# Patient Record
Sex: FEMALE | Race: WHITE | Employment: FULL TIME | URBAN - METROPOLITAN AREA
[De-identification: names, ages, dates, MRNs, and addresses within clinical notes are randomized per-mention and may not be internally consistent; named-entity substitution may affect disease eponyms.]

---

## 2017-01-25 ENCOUNTER — OFFICE VISIT (OUTPATIENT)
Dept: FAMILY MEDICINE CLINIC | Age: 52
End: 2017-01-25

## 2017-01-25 VITALS
BODY MASS INDEX: 25.9 KG/M2 | HEIGHT: 67 IN | WEIGHT: 165 LBS | RESPIRATION RATE: 17 BRPM | DIASTOLIC BLOOD PRESSURE: 62 MMHG | OXYGEN SATURATION: 97 % | SYSTOLIC BLOOD PRESSURE: 96 MMHG | HEART RATE: 64 BPM

## 2017-01-25 DIAGNOSIS — J30.1 ALLERGIC RHINITIS DUE TO POLLEN, UNSPECIFIED RHINITIS SEASONALITY: ICD-10-CM

## 2017-01-25 DIAGNOSIS — N63.10 MASS OF RIGHT BREAST: Primary | ICD-10-CM

## 2017-01-25 DIAGNOSIS — F41.9 ANXIETY: ICD-10-CM

## 2017-01-25 PROCEDURE — 99214 OFFICE O/P EST MOD 30 MIN: CPT | Performed by: NURSE PRACTITIONER

## 2017-01-25 RX ORDER — LORATADINE 10 MG/1
10 CAPSULE, LIQUID FILLED ORAL DAILY
COMMUNITY

## 2017-01-25 RX ORDER — ALPRAZOLAM 0.5 MG/1
0.5 TABLET ORAL 2 TIMES DAILY PRN
Qty: 60 TABLET | Refills: 2 | Status: SHIPPED | OUTPATIENT
Start: 2017-01-25 | End: 2017-04-26 | Stop reason: SDUPTHER

## 2017-01-25 ASSESSMENT — ENCOUNTER SYMPTOMS
SHORTNESS OF BREATH: 0
BACK PAIN: 0
ABDOMINAL DISTENTION: 0
NAUSEA: 0
VOMITING: 0

## 2017-01-27 ENCOUNTER — TELEPHONE (OUTPATIENT)
Dept: FAMILY MEDICINE CLINIC | Age: 52
End: 2017-01-27

## 2017-01-27 ENCOUNTER — HOSPITAL ENCOUNTER (OUTPATIENT)
Dept: MRI IMAGING | Age: 52
Discharge: HOME | End: 2017-01-27
Attending: NURSE PRACTITIONER | Admitting: NURSE PRACTITIONER

## 2017-01-27 ENCOUNTER — HOSPITAL ENCOUNTER (OUTPATIENT)
Dept: ULTRASOUND IMAGING | Age: 52
Discharge: HOME | End: 2017-01-27
Attending: NURSE PRACTITIONER | Admitting: NURSE PRACTITIONER

## 2017-01-27 DIAGNOSIS — R92.8 ABNORMAL MAMMOGRAM OF RIGHT BREAST: Primary | ICD-10-CM

## 2017-01-27 DIAGNOSIS — N63.10 MASS OF RIGHT BREAST: ICD-10-CM

## 2017-01-27 DIAGNOSIS — R92.8 ABNORMAL MAMMOGRAM OF RIGHT BREAST: ICD-10-CM

## 2017-01-29 DIAGNOSIS — N63.10 BREAST MASS, RIGHT: Primary | ICD-10-CM

## 2017-02-06 PROBLEM — N60.09 BREAST CYST: Status: ACTIVE | Noted: 2017-02-06

## 2017-03-06 PROBLEM — Z80.3 FAMILY HISTORY OF BREAST CANCER: Status: ACTIVE | Noted: 2017-03-06

## 2017-03-06 PROBLEM — N60.19 FIBROCYSTIC DISEASE OF BREAST: Status: ACTIVE | Noted: 2017-03-06

## 2017-04-26 ENCOUNTER — OFFICE VISIT (OUTPATIENT)
Dept: FAMILY MEDICINE CLINIC | Age: 52
End: 2017-04-26

## 2017-04-26 ENCOUNTER — TELEPHONE (OUTPATIENT)
Dept: FAMILY MEDICINE CLINIC | Age: 52
End: 2017-04-26

## 2017-04-26 VITALS
RESPIRATION RATE: 17 BRPM | OXYGEN SATURATION: 99 % | BODY MASS INDEX: 25.62 KG/M2 | HEART RATE: 64 BPM | HEIGHT: 67 IN | DIASTOLIC BLOOD PRESSURE: 62 MMHG | WEIGHT: 163.2 LBS | SYSTOLIC BLOOD PRESSURE: 100 MMHG

## 2017-04-26 DIAGNOSIS — L98.9 SKIN LESION: ICD-10-CM

## 2017-04-26 DIAGNOSIS — F41.9 ANXIETY: Primary | ICD-10-CM

## 2017-04-26 DIAGNOSIS — E03.9 HYPOTHYROIDISM, UNSPECIFIED TYPE: ICD-10-CM

## 2017-04-26 PROCEDURE — 99214 OFFICE O/P EST MOD 30 MIN: CPT | Performed by: NURSE PRACTITIONER

## 2017-04-26 RX ORDER — LEVOTHYROXINE SODIUM 0.05 MG/1
50 TABLET ORAL DAILY
Qty: 90 TABLET | Refills: 1 | Status: SHIPPED | OUTPATIENT
Start: 2017-04-26 | End: 2017-10-25 | Stop reason: SDUPTHER

## 2017-04-26 RX ORDER — ALPRAZOLAM 0.5 MG/1
0.5 TABLET ORAL 2 TIMES DAILY PRN
Qty: 180 TABLET | Refills: 1 | Status: SHIPPED | OUTPATIENT
Start: 2017-04-26 | End: 2017-10-25 | Stop reason: SDUPTHER

## 2017-04-26 ASSESSMENT — ENCOUNTER SYMPTOMS
VOMITING: 0
BACK PAIN: 0
ABDOMINAL DISTENTION: 0
SHORTNESS OF BREATH: 0
NAUSEA: 0

## 2017-04-26 ASSESSMENT — PATIENT HEALTH QUESTIONNAIRE - PHQ9
SUM OF ALL RESPONSES TO PHQ QUESTIONS 1-9: 0
SUM OF ALL RESPONSES TO PHQ9 QUESTIONS 1 & 2: 0
1. LITTLE INTEREST OR PLEASURE IN DOING THINGS: 0
2. FEELING DOWN, DEPRESSED OR HOPELESS: 0

## 2017-04-27 DIAGNOSIS — Z12.11 SCREENING FOR COLON CANCER: ICD-10-CM

## 2017-04-27 LAB
CONTROL: NORMAL
HEMOCCULT STL QL: NORMAL

## 2017-10-17 RX ORDER — DROSPIRENONE AND ETHINYL ESTRADIOL TABLETS 0.02-3(28)
KIT ORAL
Qty: 84 TABLET | Refills: 3 | Status: SHIPPED | OUTPATIENT
Start: 2017-10-17 | End: 2020-02-11

## 2017-10-25 ENCOUNTER — OFFICE VISIT (OUTPATIENT)
Dept: FAMILY MEDICINE CLINIC | Age: 52
End: 2017-10-25

## 2017-10-25 VITALS
WEIGHT: 173.2 LBS | SYSTOLIC BLOOD PRESSURE: 112 MMHG | BODY MASS INDEX: 27.18 KG/M2 | RESPIRATION RATE: 17 BRPM | DIASTOLIC BLOOD PRESSURE: 68 MMHG | OXYGEN SATURATION: 98 % | HEART RATE: 66 BPM | HEIGHT: 67 IN

## 2017-10-25 DIAGNOSIS — Z11.59 NEED FOR HEPATITIS C SCREENING TEST: ICD-10-CM

## 2017-10-25 DIAGNOSIS — R53.83 LETHARGY: Primary | ICD-10-CM

## 2017-10-25 DIAGNOSIS — Z13.0 SCREENING FOR DEFICIENCY ANEMIA: ICD-10-CM

## 2017-10-25 DIAGNOSIS — Z11.4 SCREENING FOR HIV WITHOUT PRESENCE OF RISK FACTORS: ICD-10-CM

## 2017-10-25 DIAGNOSIS — F41.9 ANXIETY: ICD-10-CM

## 2017-10-25 DIAGNOSIS — Z13.220 SCREENING FOR LIPID DISORDERS: ICD-10-CM

## 2017-10-25 DIAGNOSIS — E03.9 HYPOTHYROIDISM, UNSPECIFIED TYPE: ICD-10-CM

## 2017-10-25 DIAGNOSIS — Z13.1 SCREENING FOR DIABETES MELLITUS: ICD-10-CM

## 2017-10-25 LAB
A/G RATIO: 1.5 (ref 1.1–2.2)
ALBUMIN SERPL-MCNC: 3.8 G/DL (ref 3.4–5)
ALP BLD-CCNC: 61 U/L (ref 40–129)
ALT SERPL-CCNC: 12 U/L (ref 10–40)
ANION GAP SERPL CALCULATED.3IONS-SCNC: 12 MMOL/L (ref 3–16)
AST SERPL-CCNC: 15 U/L (ref 15–37)
BASOPHILS ABSOLUTE: 0.1 K/UL (ref 0–0.2)
BASOPHILS RELATIVE PERCENT: 1.4 %
BILIRUB SERPL-MCNC: 0.4 MG/DL (ref 0–1)
BUN BLDV-MCNC: 17 MG/DL (ref 7–20)
CALCIUM SERPL-MCNC: 8.6 MG/DL (ref 8.3–10.6)
CHLORIDE BLD-SCNC: 105 MMOL/L (ref 99–110)
CHOLESTEROL, TOTAL: 150 MG/DL (ref 0–199)
CO2: 25 MMOL/L (ref 21–32)
CREAT SERPL-MCNC: 0.9 MG/DL (ref 0.6–1.1)
EOSINOPHILS ABSOLUTE: 0.3 K/UL (ref 0–0.6)
EOSINOPHILS RELATIVE PERCENT: 2.9 %
GFR AFRICAN AMERICAN: >60
GFR NON-AFRICAN AMERICAN: >60
GLOBULIN: 2.6 G/DL
GLUCOSE BLD-MCNC: 80 MG/DL (ref 70–99)
HCT VFR BLD CALC: 39.5 % (ref 36–48)
HDLC SERPL-MCNC: 46 MG/DL (ref 40–60)
HEMOGLOBIN: 12.9 G/DL (ref 12–16)
HEPATITIS C ANTIBODY INTERPRETATION: NORMAL
LDL CHOLESTEROL CALCULATED: ABNORMAL MG/DL
LDL CHOLESTEROL DIRECT: 65 MG/DL
LYMPHOCYTES ABSOLUTE: 2 K/UL (ref 1–5.1)
LYMPHOCYTES RELATIVE PERCENT: 20.6 %
MCH RBC QN AUTO: 30.7 PG (ref 26–34)
MCHC RBC AUTO-ENTMCNC: 32.7 G/DL (ref 31–36)
MCV RBC AUTO: 93.9 FL (ref 80–100)
MONOCYTES ABSOLUTE: 0.6 K/UL (ref 0–1.3)
MONOCYTES RELATIVE PERCENT: 6.2 %
NEUTROPHILS ABSOLUTE: 6.8 K/UL (ref 1.7–7.7)
NEUTROPHILS RELATIVE PERCENT: 68.9 %
PDW BLD-RTO: 13.1 % (ref 12.4–15.4)
PLATELET # BLD: 337 K/UL (ref 135–450)
PMV BLD AUTO: 8 FL (ref 5–10.5)
POTASSIUM SERPL-SCNC: 4.6 MMOL/L (ref 3.5–5.1)
RBC # BLD: 4.2 M/UL (ref 4–5.2)
SODIUM BLD-SCNC: 142 MMOL/L (ref 136–145)
T4 FREE: 1.1 NG/DL (ref 0.9–1.8)
TOTAL PROTEIN: 6.4 G/DL (ref 6.4–8.2)
TRIGL SERPL-MCNC: 322 MG/DL (ref 0–150)
TSH REFLEX: 4.38 UIU/ML (ref 0.27–4.2)
VITAMIN D 25-HYDROXY: 39.9 NG/ML
VLDLC SERPL CALC-MCNC: ABNORMAL MG/DL
WBC # BLD: 9.9 K/UL (ref 4–11)

## 2017-10-25 PROCEDURE — 36415 COLL VENOUS BLD VENIPUNCTURE: CPT | Performed by: NURSE PRACTITIONER

## 2017-10-25 PROCEDURE — 99214 OFFICE O/P EST MOD 30 MIN: CPT | Performed by: NURSE PRACTITIONER

## 2017-10-25 RX ORDER — DIFLORASONE DIACETATE 0.5 MG/G
CREAM TOPICAL
Qty: 30 G | Refills: 1 | Status: SHIPPED | OUTPATIENT
Start: 2017-10-25 | End: 2018-10-24 | Stop reason: SDUPTHER

## 2017-10-25 RX ORDER — LEVOTHYROXINE SODIUM 0.05 MG/1
50 TABLET ORAL DAILY
Qty: 90 TABLET | Refills: 1 | Status: SHIPPED | OUTPATIENT
Start: 2017-10-25 | End: 2018-04-25 | Stop reason: SDUPTHER

## 2017-10-25 RX ORDER — ALPRAZOLAM 0.5 MG/1
0.5 TABLET ORAL 2 TIMES DAILY PRN
Qty: 180 TABLET | Refills: 1 | Status: SHIPPED | OUTPATIENT
Start: 2017-10-25 | End: 2018-04-25 | Stop reason: SDUPTHER

## 2017-10-25 ASSESSMENT — ENCOUNTER SYMPTOMS
SHORTNESS OF BREATH: 0
VOMITING: 0
NAUSEA: 0
ABDOMINAL DISTENTION: 0

## 2017-10-26 LAB — HIV-1 AND HIV-2 ANTIBODIES: NORMAL

## 2018-03-07 ENCOUNTER — OFFICE VISIT (OUTPATIENT)
Dept: FAMILY MEDICINE CLINIC | Age: 53
End: 2018-03-07

## 2018-03-07 VITALS
SYSTOLIC BLOOD PRESSURE: 110 MMHG | BODY MASS INDEX: 26.65 KG/M2 | HEART RATE: 62 BPM | WEIGHT: 169.8 LBS | DIASTOLIC BLOOD PRESSURE: 68 MMHG | OXYGEN SATURATION: 99 % | TEMPERATURE: 97.7 F | HEIGHT: 67 IN

## 2018-03-07 DIAGNOSIS — B96.89 ACUTE BACTERIAL SINUSITIS: Primary | ICD-10-CM

## 2018-03-07 DIAGNOSIS — J01.90 ACUTE BACTERIAL SINUSITIS: Primary | ICD-10-CM

## 2018-03-07 PROCEDURE — 99213 OFFICE O/P EST LOW 20 MIN: CPT | Performed by: NURSE PRACTITIONER

## 2018-03-07 RX ORDER — CIPROFLOXACIN 500 MG/1
500 TABLET, FILM COATED ORAL 2 TIMES DAILY
Qty: 20 TABLET | Refills: 0 | Status: SHIPPED | OUTPATIENT
Start: 2018-03-07 | End: 2018-03-17

## 2018-03-07 RX ORDER — METHYLPREDNISOLONE 4 MG/1
TABLET ORAL
Qty: 1 KIT | Refills: 0 | Status: SHIPPED | OUTPATIENT
Start: 2018-03-07 | End: 2019-04-18

## 2018-03-07 RX ORDER — PROMETHAZINE HYDROCHLORIDE AND CODEINE PHOSPHATE 6.25; 1 MG/5ML; MG/5ML
5 SYRUP ORAL NIGHTLY PRN
Qty: 120 ML | Refills: 0 | Status: SHIPPED | OUTPATIENT
Start: 2018-03-07 | End: 2018-04-06

## 2018-03-07 RX ORDER — BENZONATATE 200 MG/1
200 CAPSULE ORAL 3 TIMES DAILY PRN
Qty: 30 CAPSULE | Refills: 0 | Status: SHIPPED | OUTPATIENT
Start: 2018-03-07 | End: 2018-03-17

## 2018-03-07 ASSESSMENT — PATIENT HEALTH QUESTIONNAIRE - PHQ9
2. FEELING DOWN, DEPRESSED OR HOPELESS: 0
SUM OF ALL RESPONSES TO PHQ9 QUESTIONS 1 & 2: 0
SUM OF ALL RESPONSES TO PHQ QUESTIONS 1-9: 0
1. LITTLE INTEREST OR PLEASURE IN DOING THINGS: 0

## 2018-03-07 ASSESSMENT — ENCOUNTER SYMPTOMS
NAUSEA: 0
SINUS PRESSURE: 1
VOMITING: 0
COUGH: 1
VOICE CHANGE: 1
SINUS PAIN: 1
SHORTNESS OF BREATH: 0

## 2018-03-08 NOTE — PROGRESS NOTES
112/68   06/12/17 110/72     Wt Readings from Last 3 Encounters:   03/07/18 169 lb 12.8 oz (77 kg)   10/25/17 173 lb 3.2 oz (78.6 kg)   06/12/17 166 lb 6.4 oz (75.5 kg)     Body mass index is 26.59 kg/m². Physical Exam   Constitutional: She is oriented to person, place, and time. She appears well-developed and well-nourished. HENT:   Head: Normocephalic and atraumatic. Nose: Nose normal.   Mouth/Throat: Oropharynx is clear and moist.   Mild erythema bilateral TM's. Maxillary sinus tenderness to palpation   Eyes: Conjunctivae are normal. Pupils are equal, round, and reactive to light. Neck: Normal range of motion. Neck supple. Cardiovascular: Normal rate, regular rhythm and normal heart sounds. Pulmonary/Chest: Effort normal and breath sounds normal.   Abdominal: Soft. Bowel sounds are normal.   Musculoskeletal: Normal range of motion. Lymphadenopathy:     She has cervical adenopathy. Neurological: She is alert and oriented to person, place, and time. Skin: Skin is warm and dry. Psychiatric: Thought content normal.   Nursing note and vitals reviewed. ASSESSMENT/PLAN:    1. Acute bacterial sinusitis  - ciprofloxacin (CIPRO) 500 MG tablet; Take 1 tablet by mouth 2 times daily for 10 days  Dispense: 20 tablet; Refill: 0  - methylPREDNISolone (MEDROL, CHAITANYA,) 4 MG tablet; Take as directed on the pack  Dispense: 1 kit; Refill: 0  - benzonatate (TESSALON) 200 MG capsule; Take 1 capsule by mouth 3 times daily as needed for Cough  Dispense: 30 capsule; Refill: 0  - promethazine-codeine (PHENERGAN WITH CODEINE) 6.25-10 MG/5ML syrup; Take 5 mLs by mouth nightly as needed for Cough for up to 30 days. Dispense: 120 mL; Refill: 0  Increase oral fluid intake  Rest, activity as tolerated    No orders of the defined types were placed in this encounter.     Current Outpatient Prescriptions   Medication Sig Dispense Refill    ciprofloxacin (CIPRO) 500 MG tablet Take 1 tablet by mouth 2 times daily for 10 days 20

## 2018-04-04 ENCOUNTER — TELEPHONE (OUTPATIENT)
Dept: FAMILY MEDICINE CLINIC | Age: 53
End: 2018-04-04

## 2018-04-04 ENCOUNTER — HOSPITAL ENCOUNTER (OUTPATIENT)
Dept: WOMENS IMAGING | Age: 53
Discharge: HOME | End: 2018-04-04
Attending: OBSTETRICS & GYNECOLOGY | Admitting: OBSTETRICS & GYNECOLOGY

## 2018-04-04 DIAGNOSIS — Z12.39 SCREENING FOR BREAST CANCER: Primary | ICD-10-CM

## 2018-04-04 DIAGNOSIS — Z12.39 SCREENING FOR BREAST CANCER: ICD-10-CM

## 2018-04-05 DIAGNOSIS — R92.8 ABNORMAL MAMMOGRAM: Primary | ICD-10-CM

## 2018-04-10 ENCOUNTER — HOSPITAL ENCOUNTER (OUTPATIENT)
Dept: WOMENS IMAGING | Age: 53
Discharge: HOME | End: 2018-04-10
Attending: NURSE PRACTITIONER | Admitting: NURSE PRACTITIONER

## 2018-04-10 DIAGNOSIS — N63.0 LUMP OR MASS IN BREAST: ICD-10-CM

## 2018-04-10 DIAGNOSIS — R92.8 ABNORMAL MAMMOGRAM: ICD-10-CM

## 2018-04-25 ENCOUNTER — OFFICE VISIT (OUTPATIENT)
Dept: FAMILY MEDICINE CLINIC | Age: 53
End: 2018-04-25

## 2018-04-25 VITALS
HEIGHT: 67 IN | BODY MASS INDEX: 25.99 KG/M2 | OXYGEN SATURATION: 98 % | DIASTOLIC BLOOD PRESSURE: 72 MMHG | RESPIRATION RATE: 16 BRPM | SYSTOLIC BLOOD PRESSURE: 104 MMHG | WEIGHT: 165.6 LBS | HEART RATE: 65 BPM

## 2018-04-25 DIAGNOSIS — F41.9 ANXIETY: ICD-10-CM

## 2018-04-25 DIAGNOSIS — E03.9 HYPOTHYROIDISM, UNSPECIFIED TYPE: ICD-10-CM

## 2018-04-25 DIAGNOSIS — Z13.1 SCREENING FOR DIABETES MELLITUS: ICD-10-CM

## 2018-04-25 DIAGNOSIS — D22.5 ATYPICAL NEVUS OF MULTIPLE SITES OF TRUNK: ICD-10-CM

## 2018-04-25 DIAGNOSIS — Z13.220 SCREENING FOR LIPID DISORDERS: ICD-10-CM

## 2018-04-25 DIAGNOSIS — Z00.00 WELL ADULT EXAM: Primary | ICD-10-CM

## 2018-04-25 DIAGNOSIS — Z12.11 SCREENING FOR COLON CANCER: ICD-10-CM

## 2018-04-25 DIAGNOSIS — Z13.0 SCREENING FOR DEFICIENCY ANEMIA: ICD-10-CM

## 2018-04-25 LAB
A/G RATIO: 2.1 (ref 1.1–2.2)
ALBUMIN SERPL-MCNC: 4.8 G/DL (ref 3.4–5)
ALP BLD-CCNC: 74 U/L (ref 40–129)
ALT SERPL-CCNC: 32 U/L (ref 10–40)
ANION GAP SERPL CALCULATED.3IONS-SCNC: 18 MMOL/L (ref 3–16)
AST SERPL-CCNC: 31 U/L (ref 15–37)
BASOPHILS ABSOLUTE: 0 K/UL (ref 0–0.2)
BASOPHILS RELATIVE PERCENT: 0.6 %
BILIRUB SERPL-MCNC: 0.6 MG/DL (ref 0–1)
BUN BLDV-MCNC: 15 MG/DL (ref 7–20)
CALCIUM SERPL-MCNC: 9.4 MG/DL (ref 8.3–10.6)
CHLORIDE BLD-SCNC: 99 MMOL/L (ref 99–110)
CHOLESTEROL, TOTAL: 190 MG/DL (ref 0–199)
CO2: 23 MMOL/L (ref 21–32)
CREAT SERPL-MCNC: 0.9 MG/DL (ref 0.6–1.1)
EOSINOPHILS ABSOLUTE: 0.2 K/UL (ref 0–0.6)
EOSINOPHILS RELATIVE PERCENT: 2.6 %
GFR AFRICAN AMERICAN: >60
GFR NON-AFRICAN AMERICAN: >60
GLOBULIN: 2.3 G/DL
GLUCOSE BLD-MCNC: 95 MG/DL (ref 70–99)
HCT VFR BLD CALC: 43.3 % (ref 36–48)
HDLC SERPL-MCNC: 56 MG/DL (ref 40–60)
HEMOGLOBIN: 14.4 G/DL (ref 12–16)
LDL CHOLESTEROL CALCULATED: 107 MG/DL
LYMPHOCYTES ABSOLUTE: 2.7 K/UL (ref 1–5.1)
LYMPHOCYTES RELATIVE PERCENT: 44.1 %
MCH RBC QN AUTO: 30.5 PG (ref 26–34)
MCHC RBC AUTO-ENTMCNC: 33.3 G/DL (ref 31–36)
MCV RBC AUTO: 91.7 FL (ref 80–100)
MONOCYTES ABSOLUTE: 0.5 K/UL (ref 0–1.3)
MONOCYTES RELATIVE PERCENT: 8.8 %
NEUTROPHILS ABSOLUTE: 2.7 K/UL (ref 1.7–7.7)
NEUTROPHILS RELATIVE PERCENT: 43.9 %
PDW BLD-RTO: 13.8 % (ref 12.4–15.4)
PLATELET # BLD: 286 K/UL (ref 135–450)
PMV BLD AUTO: 8.6 FL (ref 5–10.5)
POTASSIUM SERPL-SCNC: 4.2 MMOL/L (ref 3.5–5.1)
RBC # BLD: 4.73 M/UL (ref 4–5.2)
SODIUM BLD-SCNC: 140 MMOL/L (ref 136–145)
TOTAL PROTEIN: 7.1 G/DL (ref 6.4–8.2)
TRIGL SERPL-MCNC: 133 MG/DL (ref 0–150)
TSH REFLEX: 2.73 UIU/ML (ref 0.27–4.2)
VLDLC SERPL CALC-MCNC: 27 MG/DL
WBC # BLD: 6.1 K/UL (ref 4–11)

## 2018-04-25 PROCEDURE — 99396 PREV VISIT EST AGE 40-64: CPT | Performed by: NURSE PRACTITIONER

## 2018-04-25 PROCEDURE — 36415 COLL VENOUS BLD VENIPUNCTURE: CPT | Performed by: NURSE PRACTITIONER

## 2018-04-25 RX ORDER — ALPRAZOLAM 0.5 MG/1
0.5 TABLET ORAL 2 TIMES DAILY PRN
Qty: 180 TABLET | Refills: 1 | Status: SHIPPED | OUTPATIENT
Start: 2018-04-25 | End: 2019-04-18

## 2018-04-25 RX ORDER — LEVOTHYROXINE SODIUM 0.05 MG/1
50 TABLET ORAL DAILY
Qty: 90 TABLET | Refills: 1 | Status: SHIPPED | OUTPATIENT
Start: 2018-04-25 | End: 2018-10-24 | Stop reason: SDUPTHER

## 2018-04-25 ASSESSMENT — ENCOUNTER SYMPTOMS
SHORTNESS OF BREATH: 0
ABDOMINAL PAIN: 0
NAUSEA: 0
VOMITING: 0

## 2018-05-04 LAB
CONTROL: NORMAL
HEMOCCULT STL QL: NORMAL

## 2018-05-22 ENCOUNTER — TELEPHONE (OUTPATIENT)
Dept: GASTROENTEROLOGY | Age: 53
End: 2018-05-22

## 2018-06-14 DIAGNOSIS — Z12.11 SCREENING FOR COLON CANCER: ICD-10-CM

## 2018-10-24 ENCOUNTER — OFFICE VISIT (OUTPATIENT)
Dept: FAMILY MEDICINE CLINIC | Age: 53
End: 2018-10-24
Payer: COMMERCIAL

## 2018-10-24 VITALS
BODY MASS INDEX: 25.58 KG/M2 | WEIGHT: 163 LBS | HEART RATE: 59 BPM | RESPIRATION RATE: 17 BRPM | HEIGHT: 67 IN | SYSTOLIC BLOOD PRESSURE: 106 MMHG | OXYGEN SATURATION: 99 % | DIASTOLIC BLOOD PRESSURE: 70 MMHG

## 2018-10-24 DIAGNOSIS — E03.9 HYPOTHYROIDISM, UNSPECIFIED TYPE: ICD-10-CM

## 2018-10-24 DIAGNOSIS — F41.9 ANXIETY: Primary | ICD-10-CM

## 2018-10-24 DIAGNOSIS — Z13.31 POSITIVE DEPRESSION SCREENING: ICD-10-CM

## 2018-10-24 DIAGNOSIS — Z28.20 IMMUNIZATION NOT CARRIED OUT BECAUSE OF PATIENT DECISION: ICD-10-CM

## 2018-10-24 DIAGNOSIS — N64.59 ABNORMAL BREAST EXAM: ICD-10-CM

## 2018-10-24 DIAGNOSIS — L30.9 DERMATITIS: ICD-10-CM

## 2018-10-24 PROCEDURE — G8431 POS CLIN DEPRES SCRN F/U DOC: HCPCS | Performed by: NURSE PRACTITIONER

## 2018-10-24 PROCEDURE — 99214 OFFICE O/P EST MOD 30 MIN: CPT | Performed by: NURSE PRACTITIONER

## 2018-10-24 RX ORDER — LEVOTHYROXINE SODIUM 0.05 MG/1
50 TABLET ORAL DAILY
Qty: 90 TABLET | Refills: 1 | Status: SHIPPED | OUTPATIENT
Start: 2018-10-24 | End: 2019-04-18 | Stop reason: SDUPTHER

## 2018-10-24 RX ORDER — ALPRAZOLAM 0.5 MG/1
0.5 TABLET ORAL NIGHTLY PRN
COMMUNITY
Start: 2018-09-14 | End: 2018-10-24 | Stop reason: DRUGHIGH

## 2018-10-24 RX ORDER — DIFLORASONE DIACETATE 0.5 MG/G
CREAM TOPICAL
Qty: 30 G | Refills: 1 | Status: SHIPPED | OUTPATIENT
Start: 2018-10-24 | End: 2019-04-18 | Stop reason: SDUPTHER

## 2018-10-24 RX ORDER — ALPRAZOLAM 0.5 MG/1
0.5 TABLET ORAL 2 TIMES DAILY PRN
Qty: 180 TABLET | Refills: 1 | Status: SHIPPED | OUTPATIENT
Start: 2018-10-24 | End: 2019-04-18 | Stop reason: SDUPTHER

## 2018-10-24 ASSESSMENT — PATIENT HEALTH QUESTIONNAIRE - PHQ9
10. IF YOU CHECKED OFF ANY PROBLEMS, HOW DIFFICULT HAVE THESE PROBLEMS MADE IT FOR YOU TO DO YOUR WORK, TAKE CARE OF THINGS AT HOME, OR GET ALONG WITH OTHER PEOPLE: 0
2. FEELING DOWN, DEPRESSED OR HOPELESS: 1
6. FEELING BAD ABOUT YOURSELF - OR THAT YOU ARE A FAILURE OR HAVE LET YOURSELF OR YOUR FAMILY DOWN: 0
9. THOUGHTS THAT YOU WOULD BE BETTER OFF DEAD, OR OF HURTING YOURSELF: 0
1. LITTLE INTEREST OR PLEASURE IN DOING THINGS: 1
5. POOR APPETITE OR OVEREATING: 0
SUM OF ALL RESPONSES TO PHQ QUESTIONS 1-9: 5
8. MOVING OR SPEAKING SO SLOWLY THAT OTHER PEOPLE COULD HAVE NOTICED. OR THE OPPOSITE, BEING SO FIGETY OR RESTLESS THAT YOU HAVE BEEN MOVING AROUND A LOT MORE THAN USUAL: 0
SUM OF ALL RESPONSES TO PHQ QUESTIONS 1-9: 5
7. TROUBLE CONCENTRATING ON THINGS, SUCH AS READING THE NEWSPAPER OR WATCHING TELEVISION: 0
3. TROUBLE FALLING OR STAYING ASLEEP: 1
SUM OF ALL RESPONSES TO PHQ9 QUESTIONS 1 & 2: 2
4. FEELING TIRED OR HAVING LITTLE ENERGY: 2

## 2018-10-24 ASSESSMENT — ENCOUNTER SYMPTOMS
VOMITING: 0
ABDOMINAL DISTENTION: 0
SHORTNESS OF BREATH: 0
NAUSEA: 0
BACK PAIN: 0

## 2018-10-24 NOTE — PATIENT INSTRUCTIONS
We are committed to providing you the best care possible. If you receive a survey after visiting one of our offices, please take time to share your experience concerning your physician office visit. These surveys are confidential and no health information about you is shared. We are eager to improve for you and we are counting on your feedback to help make that happen.         The [address] [place] [address] will call you for your appointment for mammogram

## 2018-10-24 NOTE — PROGRESS NOTES
Neurological: Negative for dizziness, weakness and headaches. Psychiatric/Behavioral: Negative for agitation and sleep disturbance. The patient is nervous/anxious. Manages well with current medications       OBJECTIVE:  /70 (Site: Right Upper Arm, Position: Sitting, Cuff Size: Medium Adult)   Pulse 59   Resp 17   Ht 5' 6.5\" (1.689 m)   Wt 163 lb (73.9 kg)   LMP 08/01/2012   SpO2 99%   BMI 25.91 kg/m²   BP Readings from Last 3 Encounters:   10/24/18 106/70   04/25/18 104/72   03/07/18 110/68     Wt Readings from Last 3 Encounters:   10/24/18 163 lb (73.9 kg)   04/25/18 165 lb 9.6 oz (75.1 kg)   03/07/18 169 lb 12.8 oz (77 kg)     Body mass index is 25.91 kg/m². Physical Exam   Constitutional: She appears well-developed and well-nourished. HENT:   Head: Normocephalic and atraumatic. Nose: Nose normal.   Mouth/Throat: Oropharynx is clear and moist.   Eyes: Pupils are equal, round, and reactive to light. Conjunctivae are normal.   Neck: Normal range of motion. Neck supple. Cardiovascular: Normal rate, regular rhythm, normal heart sounds and intact distal pulses. Pulmonary/Chest: Effort normal and breath sounds normal.   Abdominal: Soft. Bowel sounds are normal.   Musculoskeletal: Normal range of motion. Neurological: She is alert. She has normal reflexes. Skin: Skin is warm and dry. Psychiatric: She has a normal mood and affect. Her behavior is normal. Judgment and thought content normal.       ASSESSMENT/PLAN:    1. Anxiety  Healthy diet, avoid caffeine  Refill:  - ALPRAZolam (XANAX) 0.5 MG tablet; Take 1 tablet by mouth 2 times daily as needed for Anxiety for up to 180 days. .  Dispense: 180 tablet; Refill: 1  - sertraline (ZOLOFT) 50 MG tablet; TAKE 1 TABLET BY MOUTH EVERY DAY  Dispense: 90 tablet; Refill: 1    2.  Hypothyroidism, unspecified type  4/25/2018  4:13 PM - Chris, Swoh Incoming Results [facility]     Component Results     Component Value Ref Range & Units Status Collected

## 2018-11-07 ENCOUNTER — HOSPITAL ENCOUNTER (OUTPATIENT)
Dept: ULTRASOUND IMAGING | Age: 53
Discharge: HOME | End: 2018-11-07
Payer: COMMERCIAL

## 2018-11-07 ENCOUNTER — HOSPITAL ENCOUNTER (OUTPATIENT)
Dept: WOMENS IMAGING | Age: 53
Discharge: HOME | End: 2018-11-07
Payer: COMMERCIAL

## 2018-11-07 DIAGNOSIS — N64.59 ABNORMAL BREAST EXAM: ICD-10-CM

## 2018-11-07 DIAGNOSIS — N63.0 LUMP OR MASS IN BREAST: ICD-10-CM

## 2018-11-07 PROCEDURE — 77065 DX MAMMO INCL CAD UNI: CPT

## 2018-11-07 PROCEDURE — 76642 ULTRASOUND BREAST LIMITED: CPT

## 2019-03-11 ENCOUNTER — TELEPHONE (OUTPATIENT)
Dept: FAMILY MEDICINE CLINIC | Age: 54
End: 2019-03-11

## 2019-04-18 ENCOUNTER — OFFICE VISIT (OUTPATIENT)
Dept: FAMILY MEDICINE CLINIC | Age: 54
End: 2019-04-18
Payer: COMMERCIAL

## 2019-04-18 VITALS
HEART RATE: 62 BPM | BODY MASS INDEX: 25.9 KG/M2 | DIASTOLIC BLOOD PRESSURE: 64 MMHG | HEIGHT: 67 IN | OXYGEN SATURATION: 99 % | RESPIRATION RATE: 18 BRPM | SYSTOLIC BLOOD PRESSURE: 110 MMHG | WEIGHT: 165 LBS

## 2019-04-18 DIAGNOSIS — Z79.899 MEDICATION MANAGEMENT: ICD-10-CM

## 2019-04-18 DIAGNOSIS — L30.9 DERMATITIS: ICD-10-CM

## 2019-04-18 DIAGNOSIS — Z13.220 SCREENING FOR LIPID DISORDERS: ICD-10-CM

## 2019-04-18 DIAGNOSIS — Z13.1 SCREENING FOR DIABETES MELLITUS: ICD-10-CM

## 2019-04-18 DIAGNOSIS — F41.9 ANXIETY: Primary | ICD-10-CM

## 2019-04-18 DIAGNOSIS — Z13.0 SCREENING FOR DEFICIENCY ANEMIA: ICD-10-CM

## 2019-04-18 DIAGNOSIS — K58.9 IRRITABLE BOWEL SYNDROME, UNSPECIFIED TYPE: ICD-10-CM

## 2019-04-18 DIAGNOSIS — E03.9 HYPOTHYROIDISM, UNSPECIFIED TYPE: ICD-10-CM

## 2019-04-18 LAB
A/G RATIO: 1.8 (ref 1.1–2.2)
ALBUMIN SERPL-MCNC: 4.5 G/DL (ref 3.4–5)
ALP BLD-CCNC: 91 U/L (ref 40–129)
ALT SERPL-CCNC: 17 U/L (ref 10–40)
ANION GAP SERPL CALCULATED.3IONS-SCNC: 13 MMOL/L (ref 3–16)
AST SERPL-CCNC: 21 U/L (ref 15–37)
BASOPHILS ABSOLUTE: 0 K/UL (ref 0–0.2)
BASOPHILS RELATIVE PERCENT: 0.6 %
BILIRUB SERPL-MCNC: 0.7 MG/DL (ref 0–1)
BUN BLDV-MCNC: 22 MG/DL (ref 7–20)
CALCIUM SERPL-MCNC: 9.4 MG/DL (ref 8.3–10.6)
CHLORIDE BLD-SCNC: 104 MMOL/L (ref 99–110)
CHOLESTEROL, TOTAL: 182 MG/DL (ref 0–199)
CO2: 25 MMOL/L (ref 21–32)
CREAT SERPL-MCNC: 1 MG/DL (ref 0.6–1.1)
EOSINOPHILS ABSOLUTE: 0.1 K/UL (ref 0–0.6)
EOSINOPHILS RELATIVE PERCENT: 2.3 %
GFR AFRICAN AMERICAN: >60
GFR NON-AFRICAN AMERICAN: 58
GLOBULIN: 2.5 G/DL
GLUCOSE BLD-MCNC: 86 MG/DL (ref 70–99)
HCT VFR BLD CALC: 40.2 % (ref 36–48)
HDLC SERPL-MCNC: 53 MG/DL (ref 40–60)
HEMOGLOBIN: 13.5 G/DL (ref 12–16)
LDL CHOLESTEROL CALCULATED: 104 MG/DL
LYMPHOCYTES ABSOLUTE: 2.4 K/UL (ref 1–5.1)
LYMPHOCYTES RELATIVE PERCENT: 38.8 %
MCH RBC QN AUTO: 31.7 PG (ref 26–34)
MCHC RBC AUTO-ENTMCNC: 33.5 G/DL (ref 31–36)
MCV RBC AUTO: 94.6 FL (ref 80–100)
MONOCYTES ABSOLUTE: 0.5 K/UL (ref 0–1.3)
MONOCYTES RELATIVE PERCENT: 7.6 %
NEUTROPHILS ABSOLUTE: 3.2 K/UL (ref 1.7–7.7)
NEUTROPHILS RELATIVE PERCENT: 50.7 %
PDW BLD-RTO: 13.2 % (ref 12.4–15.4)
PLATELET # BLD: 292 K/UL (ref 135–450)
PMV BLD AUTO: 8.4 FL (ref 5–10.5)
POTASSIUM SERPL-SCNC: 4.1 MMOL/L (ref 3.5–5.1)
RBC # BLD: 4.25 M/UL (ref 4–5.2)
SODIUM BLD-SCNC: 142 MMOL/L (ref 136–145)
TOTAL PROTEIN: 7 G/DL (ref 6.4–8.2)
TRIGL SERPL-MCNC: 127 MG/DL (ref 0–150)
TSH REFLEX FT4: 2.84 UIU/ML (ref 0.27–4.2)
VLDLC SERPL CALC-MCNC: 25 MG/DL
WBC # BLD: 6.2 K/UL (ref 4–11)

## 2019-04-18 PROCEDURE — 99214 OFFICE O/P EST MOD 30 MIN: CPT | Performed by: NURSE PRACTITIONER

## 2019-04-18 RX ORDER — LEVOTHYROXINE SODIUM 0.05 MG/1
50 TABLET ORAL DAILY
Qty: 90 TABLET | Refills: 1 | Status: SHIPPED | OUTPATIENT
Start: 2019-04-18 | End: 2019-10-19 | Stop reason: SDUPTHER

## 2019-04-18 RX ORDER — DIFLORASONE DIACETATE 0.5 MG/G
CREAM TOPICAL
Qty: 30 G | Refills: 1 | Status: SHIPPED | OUTPATIENT
Start: 2019-04-18 | End: 2020-01-09

## 2019-04-18 RX ORDER — BIOTIN 1000 MCG
TABLET,CHEWABLE ORAL
COMMUNITY

## 2019-04-18 RX ORDER — UBIDECARENONE 75 MG
50 CAPSULE ORAL DAILY
COMMUNITY

## 2019-04-18 RX ORDER — DICYCLOMINE HYDROCHLORIDE 10 MG/1
20 CAPSULE ORAL
Qty: 120 CAPSULE | Refills: 0 | Status: SHIPPED | OUTPATIENT
Start: 2019-04-18 | End: 2019-10-22 | Stop reason: SDUPTHER

## 2019-04-18 RX ORDER — ALPRAZOLAM 0.5 MG/1
0.5 TABLET ORAL 2 TIMES DAILY PRN
Qty: 180 TABLET | Refills: 1 | Status: SHIPPED | OUTPATIENT
Start: 2019-04-18 | End: 2019-10-22 | Stop reason: SDUPTHER

## 2019-04-18 ASSESSMENT — ENCOUNTER SYMPTOMS
ABDOMINAL DISTENTION: 0
ABDOMINAL PAIN: 1
VOMITING: 0
SHORTNESS OF BREATH: 0
BACK PAIN: 0
NAUSEA: 0

## 2019-04-18 ASSESSMENT — PATIENT HEALTH QUESTIONNAIRE - PHQ9
2. FEELING DOWN, DEPRESSED OR HOPELESS: 0
1. LITTLE INTEREST OR PLEASURE IN DOING THINGS: 0
SUM OF ALL RESPONSES TO PHQ QUESTIONS 1-9: 0
SUM OF ALL RESPONSES TO PHQ QUESTIONS 1-9: 0
SUM OF ALL RESPONSES TO PHQ9 QUESTIONS 1 & 2: 0

## 2019-04-18 NOTE — PROGRESS NOTES
SUBJECTIVE:  Timurhaley Magali   1965   female   Allergies   Allergen Reactions    Tape [place] Ditto Tape] Other (See Comments)     Blisters skin    Tylenol [Acetaminophen]        Chief Complaint   Patient presents with    Anxiety    Hypothyroidism    Other     dermatitis        HPI   Patient is here today for a routine 6 month follow up for her chronic medical conditions and health maintenance. She is taking her xanax prn as directed with good benefit most of the time for her chronic anxiety. Has a colonoscopy scheduled for next week with Dr. Ovalle Servant is now working full time at [employer] instead of working from home, and it has been stressful caring for her mom with a less flexible schedule. Had a pap within the last year with Dr. Timothy Cr.     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   Social Needs    Financial resource strain: Not on file    Food insecurity:     Worry: Not on file     Inability: Not on file    Transportation needs:     Medical: Not on file     Non-medical: Not on file   Tobacco Use    Smoking status: Never Smoker    Smokeless tobacco: Never Used   Substance and Sexual Activity    Alcohol use: Yes     Comment: rarely    Drug use: No    Sexual activity: Yes     Partners: Male   Lifestyle    Physical activity:     Days per week: Not on file     Minutes per session: Not on file    Stress: Not on file   Relationships    Social connections:     Talks on phone: Not on file     Gets together: Not on file     Attends [employer] service: Not on file     Active member of club or organization: Not on file well-nourished. No distress. HENT:   Head: Normocephalic and atraumatic. Right Ear: External ear normal.   Left Ear: External ear normal.   Nose: Nose normal.   Mouth/Throat: Oropharynx is clear and moist.   Eyes: Pupils are equal, round, and reactive to light. Conjunctivae are normal.   Neck: Normal range of motion. Neck supple. Cardiovascular: Normal rate, regular rhythm, normal heart sounds and intact distal pulses. Pulmonary/Chest: Effort normal and breath sounds normal.   Abdominal: Soft. Bowel sounds are normal.   Musculoskeletal: Normal range of motion. Neurological: She is alert and oriented to person, place, and time. She has normal reflexes. Skin: Skin is warm and dry. Rash noted. Several scattered areas of fine rash with some breaks in skin. Psychiatric: She has a normal mood and affect. Her behavior is normal. Judgment and thought content normal.   Pleasant and well-groomed   Nursing note and vitals reviewed. ASSESSMENT/PLAN:    1. Anxiety  Healthy diet, avoid caffeine  Increase routine exercise as tolerated  Refill:  - ALPRAZolam (XANAX) 0.5 MG tablet; Take 1 tablet by mouth 2 times daily as needed for Anxiety for up to 180 days. Dispense: 180 tablet; Refill: 1  - sertraline (ZOLOFT) 50 MG tablet; TAKE 1 TABLET BY MOUTH EVERY DAY  Dispense: 90 tablet; Refill: 1    2. Hypothyroidism, unspecified type  - levothyroxine (SYNTHROID) 50 MCG tablet; Take 1 tablet by mouth daily  Dispense: 90 tablet; Refill: 1  - TSH WITH REFLEX TO FT4    3. Dermatitis  Keep well moisturized  Most likely stress and anxiety contributing factors  - diflorasone (PSORCON) 0.05 % cream; Apply topically 3 times daily. Dispense: 30 g; Refill: 1    4. Irritable bowel syndrome, unspecified type  Diet as tolerated  - dicyclomine (BENTYL) 10 MG capsule; Take 2 capsules by mouth 4 times daily (before meals and nightly)  Dispense: 120 capsule; Refill: 0    5.  Medication management  - Drug Panel-PM-HI Res-UR Interp-A    6. Screening for diabetes mellitus  - COMPREHENSIVE METABOLIC PANEL    7. Screening for deficiency anemia  - CBC Auto Differential    8. Screening for lipid disorders  - LIPID PANEL    Controlled Substances Monitoring:     RX Monitoring 4/18/2019   Attestation The Prescription Monitoring Report for this patient was reviewed today. Chronic Pain Routine Monitoring Possible medication side effects, risk of tolerance/dependence & alternative treatments discussed. Chronic Pain > 50 MEDD Obtained or confirmened \"Consent of Opioid Use\" on file. Chronic Pain > 80 MEDD Obtained or confirmed a written medication contract was on file. Orders Placed This Encounter   Procedures    Drug Panel-PM-HI Res-UR Interp-A     Current Outpatient Medications:  Biotin 1000 MCG CHEW, Take by mouth, Disp: , Rfl:   vitamin B-12 (CYANOCOBALAMIN) 100 MCG tablet, Take 50 mcg by mouth daily, Disp: , Rfl:   ALPRAZolam (XANAX) 0.5 MG tablet, Take 1 tablet by mouth 2 times daily as needed for Anxiety for up to 180 days. ., Disp: 180 tablet, Rfl: 1  sertraline (ZOLOFT) 50 MG tablet, TAKE 1 TABLET BY MOUTH EVERY DAY, Disp: 90 tablet, Rfl: 1  levothyroxine (SYNTHROID) 50 MCG tablet, Take 1 tablet by mouth daily, Disp: 90 tablet, Rfl: 1  diflorasone (PSORCON) 0.05 % cream, Apply topically 3 times daily. , Disp: 30 g, Rfl: 1  ALPRAZolam (XANAX) 0.5 MG tablet, Take 1 tablet by mouth 2 times daily as needed for Anxiety for up to 90 days. ., Disp: 180 tablet, Rfl: 1  loratadine (CLARITIN) 10 MG capsule, Take 10 mg by mouth daily, Disp: , Rfl:   diphenoxylate-atropine (LOMOTIL) 2.5-0.025 MG per tablet, Take 1-2 tablets by mouth 4 times daily as needed for Diarrhea., Disp: 30 tablet, Rfl: 0  vitamin D (CHOLECALCIFEROL) 1000 UNIT TABS tablet, Take 1,000 Units by mouth daily. , Disp: , Rfl:   dicyclomine (BENTYL) 10 MG capsule, Take 20 mg by mouth 4 times daily (before meals and nightly).   , Disp: , Rfl:   therapeutic multivitamin-minerals (THERAGRAN-M) tablet, Take 1 tablet by mouth daily. , Disp: , Rfl:   methylPREDNISolone (MEDROL, CHAITANYA,) 4 MG tablet, Take as directed on the pack, Disp: 1 kit, Rfl: 0  LORYNA 3-0.02 MG per tablet, USE AS DIRECTED, Disp: 84 tablet, Rfl: 3    No current facility-administered medications for this visit.  COMPREHENSIVE METABOLIC PANEL    CBC Auto Differential    LIPID PANEL     Order Specific Question:   Is Patient Fasting?/# of Hours     Answer:   12    TSH WITH REFLEX TO FT4     Current Outpatient Medications   Medication Sig Dispense Refill    Biotin 1000 MCG CHEW Take by mouth      vitamin B-12 (CYANOCOBALAMIN) 100 MCG tablet Take 50 mcg by mouth daily      ALPRAZolam (XANAX) 0.5 MG tablet Take 1 tablet by mouth 2 times daily as needed for Anxiety for up to 180 days. 180 tablet 1    sertraline (ZOLOFT) 50 MG tablet TAKE 1 TABLET BY MOUTH EVERY DAY 90 tablet 1    levothyroxine (SYNTHROID) 50 MCG tablet Take 1 tablet by mouth daily 90 tablet 1    dicyclomine (BENTYL) 10 MG capsule Take 2 capsules by mouth 4 times daily (before meals and nightly) 120 capsule 0    diflorasone (PSORCON) 0.05 % cream Apply topically 3 times daily. 30 g 1    loratadine (CLARITIN) 10 MG capsule Take 10 mg by mouth daily      vitamin D (CHOLECALCIFEROL) 1000 UNIT TABS tablet Take 1,000 Units by mouth daily.  therapeutic multivitamin-minerals (THERAGRAN-M) tablet Take 1 tablet by mouth daily.  LORYNA 3-0.02 MG per tablet USE AS DIRECTED 84 tablet 3     No current facility-administered medications for this visit. Return in about 6 months (around 10/18/2019) for anxiety. Pallavi Tejada DNP, FNP-C    Return for new or worsening symptoms or any concerns as needed.

## 2019-04-18 NOTE — PATIENT INSTRUCTIONS
Get out and do something you enjoy. Go to a funny movie, or take a walk or hike. Plan your day. Having too much or too little to do can make you anxious. · Keep a record of your symptoms. Discuss your fears with a good friend or family member, or join a support group for people with similar problems. Talking to others sometimes relieves stress. · Get involved in social groups, or volunteer to help others. Being alone sometimes makes things seem worse than they are. · Get at least 30 minutes of exercise on most days of the week to relieve stress. Walking is a good choice. You also may want to do other activities, such as running, swimming, cycling, or playing tennis or team sports. Relaxation techniques  Do relaxation exercises 10 to 20 minutes a day. You can play soothing, relaxing music while you do them, if you wish. · Tell others in your house that you are going to do your relaxation exercises. Ask them not to disturb you. · Find a comfortable place, away from all distractions and noise. · Lie down on your back, or sit with your back straight. · Focus on your breathing. Make it slow and steady. · Breathe in through your nose. Breathe out through either your nose or mouth. · Breathe deeply, filling up the area between your navel and your rib cage. Breathe so that your belly goes up and down. · Do not hold your breath. · Breathe like this for 5 to 10 minutes. Notice the feeling of calmness throughout your whole body. As you continue to breathe slowly and deeply, relax by doing the following for another 5 to 10 minutes:  · Tighten and relax each muscle group in your body. You can begin at your toes and work your way up to your head. · Imagine your muscle groups relaxing and becoming heavy. · Empty your mind of all thoughts. · Let yourself relax more and more deeply. · Become aware of the state of calmness that surrounds you.   · When your relaxation time is over, you can bring yourself back to alertness by moving your fingers and toes and then your hands and feet and then stretching and moving your entire body. Sometimes people fall asleep during relaxation, but they usually wake up shortly afterward. · Always give yourself time to return to full alertness before you drive a car or do anything that might cause an accident if you are not fully alert. Never play a relaxation tape while you drive a car. When should you call for help? Call 911 anytime you think you may need emergency care. For example, call if:    · You feel you cannot stop from hurting yourself or someone else.   Darroll Stairs the numbers for these national suicide hotlines: 8-701-617-TALK (6-313.585.8775) and 4-834-FPNBUMP (7-933.974.5492). If you or someone you know talks about suicide or feeling hopeless, get help right away.   Watch closely for changes in your health, and be sure to contact your doctor if:    · You have anxiety or fear that affects your life.     · You have symptoms of anxiety that are new or different from those you had before. Where can you learn more? Go to https://[email or web address]. org and sign in to your NutshellMail account. Enter P754 in the Expertcloud.de box to learn more about \"Anxiety Disorder: Care Instructions. \"     If you do not have an account, please click on the \"Sign Up Now\" link. Current as of: September 11, 2018  Content Version: 11.9  © 7730-4313 [employer]. Care instructions adapted under license by [facility] ([facility]). If you have questions about a medical condition or this instruction, always ask your healthcare professional. [place] [address] any warranty or liability for your use of this information.

## 2019-04-22 LAB
6-ACETYLMORPHINE: NOT DETECTED
7-AMINOCLONAZEPAM: NOT DETECTED
ALPHA-OH-ALPRAZOLAM: PRESENT
ALPRAZOLAM: NOT DETECTED
AMPHETAMINE: NOT DETECTED
BARBITURATES: NOT DETECTED
BENZOYLECGONINE: NOT DETECTED
BUPRENORPHINE: NOT DETECTED
CARISOPRODOL: NOT DETECTED
CLONAZEPAM: NOT DETECTED
CODEINE: NOT DETECTED
CREATININE URINE: 168.8 MG/DL (ref 20–400)
DIAZEPAM: NOT DETECTED
DRUGS EXPECTED: NORMAL
EER PAIN MGT DRUG PANEL, HIGH RES/EMIT U: NORMAL
ETHYL GLUCURONIDE: NOT DETECTED
FENTANYL: NOT DETECTED
HYDROCODONE: NOT DETECTED
HYDROMORPHONE: NOT DETECTED
LORAZEPAM: NOT DETECTED
MARIJUANA METABOLITE: NOT DETECTED
MDA: NOT DETECTED
MDEA: NOT DETECTED
MDMA URINE: NOT DETECTED
MEPERIDINE: NOT DETECTED
METHADONE: NOT DETECTED
METHAMPHETAMINE: NOT DETECTED
METHYLPHENIDATE: NOT DETECTED
MIDAZOLAM: NOT DETECTED
MORPHINE: NOT DETECTED
NORBUPRENORPHINE, FREE: NOT DETECTED
NORDIAZEPAM: NOT DETECTED
NORFENTANYL: NOT DETECTED
NORHYDROCODONE, URINE: NOT DETECTED
NOROXYCODONE: NOT DETECTED
NOROXYMORPHONE, URINE: NOT DETECTED
OXAZEPAM: NOT DETECTED
OXYCODONE: NOT DETECTED
OXYMORPHONE: NOT DETECTED
PAIN MANAGEMENT DRUG PANEL: NORMAL
PAIN MANAGEMENT DRUG PANEL: NORMAL
PCP: NOT DETECTED
PHENTERMINE: NOT DETECTED
PROPOXYPHENE: NOT DETECTED
TAPENTADOL, URINE: NOT DETECTED
TAPENTADOL-O-SULFATE, URINE: NOT DETECTED
TEMAZEPAM: NOT DETECTED
TRAMADOL: NOT DETECTED
ZOLPIDEM: NOT DETECTED

## 2019-10-19 DIAGNOSIS — E03.9 HYPOTHYROIDISM, UNSPECIFIED TYPE: ICD-10-CM

## 2019-10-21 RX ORDER — LEVOTHYROXINE SODIUM 0.05 MG/1
TABLET ORAL
Qty: 90 TABLET | Refills: 1 | Status: SHIPPED | OUTPATIENT
Start: 2019-10-21 | End: 2019-10-22 | Stop reason: SDUPTHER

## 2019-10-22 ENCOUNTER — OFFICE VISIT (OUTPATIENT)
Dept: FAMILY MEDICINE CLINIC | Age: 54
End: 2019-10-22
Payer: COMMERCIAL

## 2019-10-22 VITALS
WEIGHT: 170 LBS | DIASTOLIC BLOOD PRESSURE: 66 MMHG | HEIGHT: 67 IN | SYSTOLIC BLOOD PRESSURE: 110 MMHG | BODY MASS INDEX: 26.68 KG/M2 | HEART RATE: 61 BPM | RESPIRATION RATE: 12 BRPM | OXYGEN SATURATION: 97 %

## 2019-10-22 DIAGNOSIS — M25.551 RIGHT HIP PAIN: ICD-10-CM

## 2019-10-22 DIAGNOSIS — K58.9 IRRITABLE BOWEL SYNDROME, UNSPECIFIED TYPE: ICD-10-CM

## 2019-10-22 DIAGNOSIS — F41.9 ANXIETY: Primary | ICD-10-CM

## 2019-10-22 DIAGNOSIS — E03.9 HYPOTHYROIDISM, UNSPECIFIED TYPE: ICD-10-CM

## 2019-10-22 DIAGNOSIS — Z12.39 SCREENING FOR BREAST CANCER: ICD-10-CM

## 2019-10-22 PROCEDURE — 99214 OFFICE O/P EST MOD 30 MIN: CPT | Performed by: NURSE PRACTITIONER

## 2019-10-22 RX ORDER — ASCORBIC ACID 500 MG
500 TABLET ORAL DAILY
COMMUNITY

## 2019-10-22 RX ORDER — ALPRAZOLAM 0.5 MG/1
0.5 TABLET ORAL 2 TIMES DAILY PRN
Qty: 180 TABLET | Refills: 1 | Status: SHIPPED | OUTPATIENT
Start: 2019-10-22 | End: 2020-04-13 | Stop reason: SDUPTHER

## 2019-10-22 RX ORDER — DICYCLOMINE HYDROCHLORIDE 10 MG/1
20 CAPSULE ORAL
Qty: 120 CAPSULE | Refills: 0 | Status: SHIPPED | OUTPATIENT
Start: 2019-10-22 | End: 2022-05-23

## 2019-10-22 RX ORDER — ALPRAZOLAM 0.5 MG/1
0.5 TABLET ORAL 2 TIMES DAILY PRN
Qty: 180 TABLET | Refills: 1 | Status: SHIPPED | OUTPATIENT
Start: 2019-10-22 | End: 2019-10-22 | Stop reason: DRUGHIGH

## 2019-10-22 RX ORDER — SERTRALINE HYDROCHLORIDE 25 MG/1
TABLET, FILM COATED ORAL
Qty: 90 TABLET | Refills: 1 | Status: SHIPPED | OUTPATIENT
Start: 2019-10-22 | End: 2020-04-13 | Stop reason: SDUPTHER

## 2019-10-22 RX ORDER — LEVOTHYROXINE SODIUM 0.05 MG/1
50 TABLET ORAL DAILY
Qty: 90 TABLET | Refills: 1 | Status: SHIPPED | OUTPATIENT
Start: 2019-10-22 | End: 2020-04-13 | Stop reason: SDUPTHER

## 2019-10-22 ASSESSMENT — ENCOUNTER SYMPTOMS
VOMITING: 0
SHORTNESS OF BREATH: 0
BACK PAIN: 0
NAUSEA: 0
ABDOMINAL DISTENTION: 0

## 2020-01-09 RX ORDER — DIFLORASONE DIACETATE 0.5 MG/G
CREAM TOPICAL
Qty: 30 G | Refills: 1 | Status: SHIPPED | OUTPATIENT
Start: 2020-01-09 | End: 2020-02-11

## 2020-02-11 ENCOUNTER — APPOINTMENT (OUTPATIENT)
Dept: GENERAL RADIOLOGY | Age: 55
End: 2020-02-11
Payer: COMMERCIAL

## 2020-02-11 ENCOUNTER — APPOINTMENT (OUTPATIENT)
Dept: CT IMAGING | Age: 55
End: 2020-02-11
Payer: COMMERCIAL

## 2020-02-11 ENCOUNTER — HOSPITAL ENCOUNTER (EMERGENCY)
Age: 55
Discharge: HOME | End: 2020-02-11
Attending: EMERGENCY MEDICINE
Payer: COMMERCIAL

## 2020-02-11 VITALS
HEART RATE: 61 BPM | OXYGEN SATURATION: 100 % | BODY MASS INDEX: 25.11 KG/M2 | SYSTOLIC BLOOD PRESSURE: 125 MMHG | WEIGHT: 160 LBS | HEIGHT: 67 IN | RESPIRATION RATE: 16 BRPM | TEMPERATURE: 98.1 F | DIASTOLIC BLOOD PRESSURE: 79 MMHG

## 2020-02-11 LAB
ALBUMIN SERPL-MCNC: 4.7 GM/DL (ref 3.4–5)
ALP BLD-CCNC: 88 IU/L (ref 40–129)
ALT SERPL-CCNC: 18 U/L (ref 10–40)
ANION GAP SERPL CALCULATED.3IONS-SCNC: 4 MMOL/L (ref 4–16)
AST SERPL-CCNC: 20 IU/L (ref 15–37)
BASOPHILS ABSOLUTE: 0 K/CU MM
BASOPHILS RELATIVE PERCENT: 0.4 % (ref 0–1)
BILIRUB SERPL-MCNC: 0.6 MG/DL (ref 0–1)
BUN BLDV-MCNC: 18 MG/DL (ref 6–23)
CALCIUM SERPL-MCNC: 9.3 MG/DL (ref 8.3–10.6)
CHLORIDE BLD-SCNC: 104 MMOL/L (ref 99–110)
CO2: 31 MMOL/L (ref 21–32)
CREAT SERPL-MCNC: 0.9 MG/DL (ref 0.6–1.1)
DIFFERENTIAL TYPE: ABNORMAL
EOSINOPHILS ABSOLUTE: 0.1 K/CU MM
EOSINOPHILS RELATIVE PERCENT: 1.7 % (ref 0–3)
ERYTHROCYTE SEDIMENTATION RATE: 11 MM/HR (ref 0–30)
GFR AFRICAN AMERICAN: >60 ML/MIN/1.73M2
GFR NON-AFRICAN AMERICAN: >60 ML/MIN/1.73M2
GLUCOSE BLD-MCNC: 98 MG/DL (ref 70–99)
HCT VFR BLD CALC: 43.2 % (ref 37–47)
HEMOGLOBIN: 13.9 GM/DL (ref 12.5–16)
IMMATURE NEUTROPHIL %: 0.3 % (ref 0–0.43)
LIPASE: 35 IU/L (ref 13–60)
LYMPHOCYTES ABSOLUTE: 1.9 K/CU MM
LYMPHOCYTES RELATIVE PERCENT: 25.6 % (ref 24–44)
MCH RBC QN AUTO: 30.8 PG (ref 27–31)
MCHC RBC AUTO-ENTMCNC: 32.2 % (ref 32–36)
MCV RBC AUTO: 95.6 FL (ref 78–100)
MONOCYTES ABSOLUTE: 0.5 K/CU MM
MONOCYTES RELATIVE PERCENT: 7.2 % (ref 0–4)
PDW BLD-RTO: 12.5 % (ref 11.7–14.9)
PLATELET # BLD: 312 K/CU MM (ref 140–440)
PMV BLD AUTO: 9.2 FL (ref 7.5–11.1)
POTASSIUM SERPL-SCNC: 4.4 MMOL/L (ref 3.5–5.1)
RBC # BLD: 4.52 M/CU MM (ref 4.2–5.4)
SEGMENTED NEUTROPHILS ABSOLUTE COUNT: 4.9 K/CU MM
SEGMENTED NEUTROPHILS RELATIVE PERCENT: 64.8 % (ref 36–66)
SODIUM BLD-SCNC: 139 MMOL/L (ref 135–145)
TOTAL IMMATURE NEUTOROPHIL: 0.02 K/CU MM
TOTAL PROTEIN: 7.4 GM/DL (ref 6.4–8.2)
TROPONIN T: <0.01 NG/ML
WBC # BLD: 7.5 K/CU MM (ref 4–10.5)

## 2020-02-11 PROCEDURE — 96374 THER/PROPH/DIAG INJ IV PUSH: CPT

## 2020-02-11 PROCEDURE — 6360000002 HC RX W HCPCS: Performed by: EMERGENCY MEDICINE

## 2020-02-11 PROCEDURE — 86038 ANTINUCLEAR ANTIBODIES: CPT

## 2020-02-11 PROCEDURE — 83690 ASSAY OF LIPASE: CPT

## 2020-02-11 PROCEDURE — 80053 COMPREHEN METABOLIC PANEL: CPT

## 2020-02-11 PROCEDURE — 93010 ELECTROCARDIOGRAM REPORT: CPT | Performed by: INTERNAL MEDICINE

## 2020-02-11 PROCEDURE — 85025 COMPLETE CBC W/AUTO DIFF WBC: CPT

## 2020-02-11 PROCEDURE — 93005 ELECTROCARDIOGRAM TRACING: CPT | Performed by: EMERGENCY MEDICINE

## 2020-02-11 PROCEDURE — 71275 CT ANGIOGRAPHY CHEST: CPT

## 2020-02-11 PROCEDURE — 84484 ASSAY OF TROPONIN QUANT: CPT

## 2020-02-11 PROCEDURE — 99284 EMERGENCY DEPT VISIT MOD MDM: CPT

## 2020-02-11 PROCEDURE — 6360000004 HC RX CONTRAST MEDICATION: Performed by: EMERGENCY MEDICINE

## 2020-02-11 PROCEDURE — 85652 RBC SED RATE AUTOMATED: CPT

## 2020-02-11 PROCEDURE — 71046 X-RAY EXAM CHEST 2 VIEWS: CPT

## 2020-02-11 RX ORDER — OXYCODONE HYDROCHLORIDE 10 MG/1
5 TABLET ORAL EVERY 8 HOURS PRN
Qty: 21 TABLET | Refills: 0 | Status: SHIPPED | OUTPATIENT
Start: 2020-02-11 | End: 2020-03-03

## 2020-02-11 RX ORDER — KETOROLAC TROMETHAMINE 30 MG/ML
30 INJECTION, SOLUTION INTRAMUSCULAR; INTRAVENOUS ONCE
Status: COMPLETED | OUTPATIENT
Start: 2020-02-11 | End: 2020-02-11

## 2020-02-11 RX ADMIN — KETOROLAC TROMETHAMINE 30 MG: 30 INJECTION, SOLUTION INTRAMUSCULAR; INTRAVENOUS at 06:58

## 2020-02-11 RX ADMIN — IOPAMIDOL 75 ML: 755 INJECTION, SOLUTION INTRAVENOUS at 07:21

## 2020-02-11 ASSESSMENT — PAIN DESCRIPTION - ORIENTATION: ORIENTATION: RIGHT

## 2020-02-11 ASSESSMENT — ENCOUNTER SYMPTOMS
SHORTNESS OF BREATH: 0
WHEEZING: 0
APNEA: 0
CHEST TIGHTNESS: 0
COUGH: 0
STRIDOR: 0

## 2020-02-11 ASSESSMENT — PAIN SCALES - GENERAL
PAINLEVEL_OUTOF10: 5
PAINLEVEL_OUTOF10: 5

## 2020-02-11 ASSESSMENT — PAIN DESCRIPTION - LOCATION: LOCATION: RIB CAGE

## 2020-02-11 ASSESSMENT — PAIN DESCRIPTION - PAIN TYPE: TYPE: ACUTE PAIN

## 2020-02-11 NOTE — ED PROVIDER NOTES
Triage Chief Complaint:   Pleurisy (Right rib discomfort with breaths.)    [place]:  Renée Javier is a 53 y.o. female that presents to the ED complaining of rib discomfort. Happened last Thursday. Denies any falls into trauma. She has had a mass removed from her breast states it was not cancerous. No history of DVT or PE. Denies any falls injury or trauma she is not short of breath it just hurts to take a deep breath. No noted rash. She points to the right infra breast region. There is no radiation denies any cough fever chills sputum production or hemoptysis. Past Medical History:   Diagnosis Date    Breast mass, right 01/2017    IBS (irritable bowel syndrome)     Kidney stones     Thyroid disease      Past Surgical History:   Procedure Laterality Date    BREAST SURGERY Left     MASS REMOVAL     HYSTERECTOMY      Right ovary and fall. tube removed.  LITHOTRIPSY      NECK SURGERY      swollen gland removed from right neck     History reviewed. No pertinent family history.   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   Social Needs    Financial resource strain: Not on file    Food insecurity:     Worry: Not on file     Inability: Not on file   [employer] needs:     Medical: Not on file     Non-medical: Not on file   Tobacco Use    Smoking status: Never Smoker    Smokeless tobacco: Never Used   Substance and Sexual Activity    Alcohol use: Yes     Comment: rarely    Drug use: No    Sexual activity: Yes     Partners: Male   Lifestyle    Physical activity:     Days per week: Not on file     Minutes per session: Not on file    Stress: Not on file   Relationships    Social connections:     Talks on phone: Not on file     Gets together: Not on file Attends [employer] service: Not on file     Active member of club or organization: Not on file     Attends meetings of clubs or organizations: Not on file     Relationship status: Not on file    Intimate partner violence:     Fear of current or ex partner: Not on file     Emotionally abused: Not on file     Physically abused: Not on file     Forced sexual activity: Not on file   Other Topics Concern    Not on file   Social History Narrative    Not on file     No current facility-administered medications for this encounter. Current Outpatient Medications   Medication Sig Dispense Refill    oxyCODONE HCl (OXY-IR) 10 MG immediate release tablet Take 0.5 tablets by mouth every 8 hours as needed for Pain for up to 21 days. Intended supply: 30 days 21 tablet 0    vitamin C (ASCORBIC ACID) 500 MG tablet Take 500 mg by mouth daily      levothyroxine (SYNTHROID) 50 MCG tablet Take 1 tablet by mouth Daily 90 tablet 1    dicyclomine (BENTYL) 10 MG capsule Take 2 capsules by mouth 4 times daily (before meals and nightly) 120 capsule 0    sertraline (ZOLOFT) 25 MG tablet TAKE 1 TABLET BY MOUTH EVERY DAY 90 tablet 1    ALPRAZolam (XANAX) 0.5 MG tablet Take 1 tablet by mouth 2 times daily as needed for Anxiety for up to 180 days. 180 tablet 1    Biotin 1000 MCG CHEW Take by mouth      vitamin B-12 (CYANOCOBALAMIN) 100 MCG tablet Take 50 mcg by mouth daily      loratadine (CLARITIN) 10 MG capsule Take 10 mg by mouth daily      vitamin D (CHOLECALCIFEROL) 1000 UNIT TABS tablet Take 1,000 Units by mouth daily.  therapeutic multivitamin-minerals (THERAGRAN-M) tablet Take 1 tablet by mouth daily. Allergies   Allergen Reactions    Tape Jack Ruck Tape] Other (See Comments)     Blisters skin    Tylenol [Acetaminophen]          ROS:    Review of Systems   Respiratory: Negative for apnea, cough, chest tightness, shortness of breath, wheezing and stridor.          Right infra breast pleuritic chest pain GFR African American >60 >60 mL/min/1.73m2    Anion Gap 4 4 - 16   Troponin   Result Value Ref Range    Troponin T <0.010 <0.01 NG/ML   Lipase   Result Value Ref Range    Lipase 35 13 - 60 IU/L   EKG 12 Lead   Result Value Ref Range    Ventricular Rate 60 BPM    Atrial Rate 60 BPM    P-R Interval 154 ms    QRS Duration 90 ms    Q-T Interval 412 ms    QTc Calculation (Bazett) 412 ms    P Axis 72 degrees    R Axis -21 degrees    T Axis -20 degrees    Diagnosis       Normal sinus rhythm  Low voltage QRS  Cannot rule out Anterior infarct , age undetermined  Abnormal ECG  No previous ECGs available        Radiographs (if obtained):  [] The following radiograph wasinterpreted by myself in the absence of a radiologist:   [] Radiologist's Report Reviewed:  CTA PULMONARY W CONTRAST   Final Result   No PE identified. Small right pleural effusion. XR CHEST STANDARD (2 VW)   Preliminary Result   Subtle right costophrenic angle blunting which could relate to mild   atelectasis or pleural thickening. No significant effusion or evidence of   pneumonia. EKG (if obtained): (All EKG's are interpreted by myself in the absence of a cardiologist)      The 12 lead EKG was interpreted by me, and the interpretation is as follows:  normal sinus rhythm, rate = 60. Intervals are within the normal range. QTc is not prolonged. ST elevations are not present. T wave inversions are present. NEW  Non-specific T wave changes are present. Delta waves, Brugada Syndrome, and Short MD are not present. There is no acute ischemia. It was compared against an EKG from 11- with acute changes. Chart review shows recent radiographs:  No results found. MDM:    Presents to the ED with severe right pleuritic chest pain. CT was obtained reveals a small pleural effusion. Fortunately there is no underlying malignancy. She does have an abnormal EKG that will require outpatient follow-up.   Is nonspecific and does

## 2020-02-11 NOTE — ED NOTES
She reports right rib pain with breaths or movement. She recalls lifting a box about three days ago, over her head, but did not have an injury at that time. She denies any other occurrence that may have caused the pain. Upon evaluation of the client, she is observed to be alert, oriented to place, person, and situation. She verbalizes appropriately to all questions and/or comments. She also makes eye contact when prompted. The client also exhibits unlabored breathing, her skin is warm and dry, and she is observed as having relaxed extremities and facial expressions. The client's chest rise and expansion is equal and symmetrical with breaths. When communicating she speaks in clear and complete sentences. She speaks at a normal pace and with a normal tone. The client presents with a non toxic appearance. She obeys commands and moves all extremities freely and without hesitation or guarding. Although she does guard her right rib area and wince with breaths at times. The client is observed to ambulate with a steady gait and exhibits no shuffling or hesitation with steps. She performs this task with no assistance from a cane or walker or crutches.      Altaf Rodríguez RN  02/11/20 0600

## 2020-02-13 ENCOUNTER — OFFICE VISIT (OUTPATIENT)
Dept: FAMILY MEDICINE CLINIC | Age: 55
End: 2020-02-13
Payer: COMMERCIAL

## 2020-02-13 VITALS
WEIGHT: 170.4 LBS | SYSTOLIC BLOOD PRESSURE: 126 MMHG | HEART RATE: 57 BPM | RESPIRATION RATE: 16 BRPM | DIASTOLIC BLOOD PRESSURE: 72 MMHG | HEIGHT: 67 IN | BODY MASS INDEX: 26.74 KG/M2 | OXYGEN SATURATION: 97 %

## 2020-02-13 LAB
ANTI-NUCLEAR ANTIBODY (ANA): NORMAL
ANTI-NUCLEAR ANTIBODY (ANA): NORMAL
EKG ATRIAL RATE: 60 BPM
EKG DIAGNOSIS: NORMAL
EKG P AXIS: 72 DEGREES
EKG P-R INTERVAL: 154 MS
EKG Q-T INTERVAL: 412 MS
EKG QRS DURATION: 90 MS
EKG QTC CALCULATION (BAZETT): 412 MS
EKG R AXIS: -21 DEGREES
EKG T AXIS: -20 DEGREES
EKG VENTRICULAR RATE: 60 BPM

## 2020-02-13 PROCEDURE — 99213 OFFICE O/P EST LOW 20 MIN: CPT | Performed by: NURSE PRACTITIONER

## 2020-02-13 ASSESSMENT — PATIENT HEALTH QUESTIONNAIRE - PHQ9
SUM OF ALL RESPONSES TO PHQ QUESTIONS 1-9: 0
SUM OF ALL RESPONSES TO PHQ QUESTIONS 1-9: 0
SUM OF ALL RESPONSES TO PHQ9 QUESTIONS 1 & 2: 0
1. LITTLE INTEREST OR PLEASURE IN DOING THINGS: 0
2. FEELING DOWN, DEPRESSED OR HOPELESS: 0

## 2020-02-13 ASSESSMENT — ENCOUNTER SYMPTOMS
NAUSEA: 0
BACK PAIN: 0
VOMITING: 0
ABDOMINAL DISTENTION: 0

## 2020-02-13 NOTE — LETTER
[address] [address] [address] [facility],[address] [place] [address]  Phone: [phone]  Fax: [phone]    MYRNA Pérez CNP        February 13, 2020     Patient: Renée Javier   YOB: 1965   Date of Visit: 2/13/2020       To Whom It May Concern: It is my medical opinion that [facility] should be off of work Tuesday, February 4, 2020 through Friday, February 7, 2020, due to illness. If you have any questions or concerns, please don't hesitate to call.     Sincerely,        MYRNA Pérez CNP

## 2020-02-13 NOTE — PROGRESS NOTES
SUBJECTIVE:  Shelley Alina   1965   female   Allergies   Allergen Reactions    Tape Dois Steve Tape] Other (See Comments)     Blisters skin    Tylenol [Acetaminophen]        Chief Complaint   Patient presents with    ED Follow-up        HPI   Reynold Romberg is here today for an ED follow up. She was seen there two days ago for chest pain and shortness of breath, and diagnosed with pleural effusion. Given oxycodone for pain prn, and is taking at hs only due to sedative effect. Also abnormal ekg, has follow up with cardiologist (Jackson Grey) in 2 weeks.     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   Social Needs    Financial resource strain: Not on file    Food insecurity:     Worry: Not on file     Inability: Not on file   Jobyourlife needs:     Medical: Not on file     Non-medical: Not on file   Tobacco Use    Smoking status: Never Smoker    Smokeless tobacco: Never Used   Substance and Sexual Activity    Alcohol use: Yes     Comment: rarely    Drug use: No    Sexual activity: Yes     Partners: Male   Lifestyle    Physical activity:     Days per week: Not on file     Minutes per session: Not on file    Stress: Not on file   Relationships    Social connections:     Talks on phone: Not on file     Gets together: Not on file     Attends [employer] service: Not on file     Active member of club or organization: Not on file     Attends meetings of clubs or organizations: Not on file     Relationship status: Not on file    Intimate partner violence:     Fear of current or ex partner: Not on file     Emotionally abused: Not on file round, and reactive to light. Neck:      Musculoskeletal: Normal range of motion and neck supple. Cardiovascular:      Rate and Rhythm: Normal rate and regular rhythm. Heart sounds: Normal heart sounds. Pulmonary:      Effort: Pulmonary effort is normal.      Breath sounds: Normal breath sounds. Abdominal:      General: Bowel sounds are normal.      Palpations: Abdomen is soft. Musculoskeletal: Normal range of motion. General: Tenderness present. Comments: Right lateral rib cage to palpation   Skin:     General: Skin is warm and dry. Capillary Refill: Capillary refill takes less than 2 seconds. Neurological:      Mental Status: She is alert and oriented to person, place, and time. Deep Tendon Reflexes: Reflexes are normal and symmetric. Psychiatric:         Behavior: Behavior normal.         Thought Content: Thought content normal.         Judgment: Judgment normal.         ASSESSMENT/PLAN:    1. Pleural effusion  Activity as tolerated  Verbal and written instructions given  Follow up with cardiology as scheduled. No orders of the defined types were placed in this encounter. Current Outpatient Medications   Medication Sig Dispense Refill    oxyCODONE HCl (OXY-IR) 10 MG immediate release tablet Take 0.5 tablets by mouth every 8 hours as needed for Pain for up to 21 days. Intended supply: 30 days 21 tablet 0    vitamin C (ASCORBIC ACID) 500 MG tablet Take 500 mg by mouth daily      levothyroxine (SYNTHROID) 50 MCG tablet Take 1 tablet by mouth Daily 90 tablet 1    dicyclomine (BENTYL) 10 MG capsule Take 2 capsules by mouth 4 times daily (before meals and nightly) 120 capsule 0    sertraline (ZOLOFT) 25 MG tablet TAKE 1 TABLET BY MOUTH EVERY DAY 90 tablet 1    ALPRAZolam (XANAX) 0.5 MG tablet Take 1 tablet by mouth 2 times daily as needed for Anxiety for up to 180 days.  180 tablet 1    Biotin 1000 MCG CHEW Take by mouth      vitamin B-12 (CYANOCOBALAMIN) 100 MCG tablet Take 50 mcg by mouth daily      loratadine (CLARITIN) 10 MG capsule Take 10 mg by mouth daily      vitamin D (CHOLECALCIFEROL) 1000 UNIT TABS tablet Take 1,000 Units by mouth daily.  therapeutic multivitamin-minerals (THERAGRAN-M) tablet Take 1 tablet by mouth daily. No current facility-administered medications for this visit. Return if symptoms worsen or fail to improve. Tye Mtz DNP, FNP-C    Return for new or worsening symptoms or any concerns as needed.

## 2020-02-13 NOTE — PATIENT INSTRUCTIONS
I am committed to providing you the best care possible. If you receive a survey after visiting our office, please take time to share your experience concerning your office visit. These surveys are confidential and no health information about you is shared. I am eager to improve for you and I am counting on your feedback to help make that happen. Follow with cardiologist as scheduled, or sooner if needed for any concerns. Please reschedule your mammogram when you are feeling improved. Patient Education        Learning About a Pleural Effusion  What is it? A pleural effusion (say \"PLER-manuel ji-HBXG-wfcu\") is the buildup of fluid in the space between tissues lining the lungs and the chest wall. Because of the fluid buildup, the lungs may not be able to expand completely. This can make it hard to breathe. A pleural empyema (say \"gj-se-WQ-muh\") is a problem that can happen with pleural effusion. Bacteria or other infections cause pus to form in the pleural fluid. But most pleural effusions don't become infected. What causes it? A pleural effusion has many causes. They include pneumonia, cancer, inflammation of the tissues around the lungs, and heart failure. What are the symptoms? Symptoms of a pleural effusion may include:  · Trouble breathing. · Shortness of breath. · Chest pain. · Fever. · A cough. A minor pleural effusion may not cause any symptoms. How is it diagnosed? A pleural effusion is usually diagnosed with an X-ray and a physical exam. The doctor listens to the airflow in your lungs. How is it treated? A pleural effusion can be treated by removing fluid from the space between the tissues around the lungs. This is done with a needle that's put into the chest (thoracentesis). A small amount of the fluid may be sent to a lab to find out what is causing the buildup of fluid. Removing the fluid may help to relieve symptoms, such as shortness of breath and chest pain.  It can help

## 2020-02-14 ASSESSMENT — ENCOUNTER SYMPTOMS: SHORTNESS OF BREATH: 1

## 2020-04-13 ENCOUNTER — TELEMEDICINE (OUTPATIENT)
Dept: FAMILY MEDICINE CLINIC | Age: 55
End: 2020-04-13
Payer: COMMERCIAL

## 2020-04-13 PROCEDURE — 99213 OFFICE O/P EST LOW 20 MIN: CPT | Performed by: NURSE PRACTITIONER

## 2020-04-13 RX ORDER — LEVOTHYROXINE SODIUM 0.05 MG/1
50 TABLET ORAL DAILY
Qty: 90 TABLET | Refills: 1 | Status: SHIPPED | OUTPATIENT
Start: 2020-04-13 | End: 2020-10-19 | Stop reason: SDUPTHER

## 2020-04-13 RX ORDER — ALPRAZOLAM 0.5 MG/1
0.5 TABLET ORAL 2 TIMES DAILY PRN
Qty: 180 TABLET | Refills: 1 | Status: SHIPPED | OUTPATIENT
Start: 2020-04-13 | End: 2021-01-28 | Stop reason: SDUPTHER

## 2020-04-13 RX ORDER — SERTRALINE HYDROCHLORIDE 25 MG/1
TABLET, FILM COATED ORAL
Qty: 90 TABLET | Refills: 1 | Status: SHIPPED | OUTPATIENT
Start: 2020-04-13 | End: 2020-10-19 | Stop reason: SDUPTHER

## 2020-04-13 NOTE — PROGRESS NOTES
Discharge [x]  None visible  [] Abnormal -    HENT:   [x] Normocephalic, atraumatic. [] Abnormal   [x] Mouth/Throat: Mucous membranes are moist.     External Ears [x] Normal  [] Abnormal-     Neck: [x] No visualized mass     Pulmonary/Chest: [x] Respiratory effort normal.  [x] No visualized signs of difficulty breathing or respiratory distress        [] Abnormal-      Musculoskeletal:   [x] Normal gait with no signs of ataxia         [x] Normal range of motion of neck        [] Abnormal-       Neurological:        [x] No Facial Asymmetry (Cranial nerve 7 motor function) (limited exam to video visit)          [x] No gaze palsy        [] Abnormal-         Skin:        [x] No significant exanthematous lesions or discoloration noted on facial skin         [] Abnormal-            Psychiatric:       [x] Normal Affect [] No Hallucinations        [] Abnormal-     Other pertinent observable physical exam findings-     ASSESSMENT/PLAN:  1. Hypothyroidism, unspecified type  Refill:  - levothyroxine (SYNTHROID) 50 MCG tablet; Take 1 tablet by mouth Daily  Dispense: 90 tablet; Refill: 1  Will fax order to [facility] per patient request  - TSH WITH REFLEX TO FT4; Future    2. Anxiety  Stable  Refill:  - sertraline (ZOLOFT) 25 MG tablet; TAKE 1 TABLET BY MOUTH EVERY DAY  Dispense: 90 tablet; Refill: 1  - ALPRAZolam (XANAX) 0.5 MG tablet; Take 1 tablet by mouth 2 times daily as needed for Anxiety for up to 180 days. Dispense: 180 tablet; Refill: 1  Healthy diet, avoid caffeine, increase routine exercise as tolerated    3.  Irritable bowel syndrome, unspecified type  Diet as tolerated  Try to avoid stress  bentyl prn as directed    Patient has order for mammogram and will have done when outpatient radiology is open again at [facility]    Controlled Substance Monitoring:    Acute and Chronic Pain Monitoring:   RX Monitoring 4/13/2020   Attestation -   Acute Pain Prescriptions -   Periodic Controlled Substance Monitoring Possible medication side effects, risk of tolerance/dependence & alternative treatments discussed. ;No signs of potential drug abuse or diversion identified. Chronic Pain > 50 MEDD Obtained or confirmed \"Consent for Opioid Use\" on file. Chronic Pain > 80 MEDD -         Return in about 6 months (around 10/13/2020) for hypothyroid, anxiety. Flakita Boswell is a 63 y.o. female being evaluated by a Virtual Visit (video visit) encounter to address concerns as mentioned above. A caregiver was present when appropriate. Due to this being a TeleHealth encounter (During [facility] public health emergency), evaluation of the following organ systems was limited: Vitals/Constitutional/EENT/Resp/CV/GI//MS/Neuro/Skin/Heme-Lymph-Imm. Pursuant to the emergency declaration under the [address] [place] [address], [address] [place] [address] authority and the [employer] Act, this Virtual Visit was conducted with patient's (and/or legal guardian's) consent, to reduce the patient's risk of exposure to COVID-19 and provide necessary medical care. The patient (and/or legal guardian) has also been advised to contact this office for worsening conditions or problems, and seek emergency medical treatment and/or call 911 if deemed necessary. Services were provided through a video synchronous discussion virtually to substitute for in-person clinic visit. Patient and provider were located at their individual homes. Encounter initiated 8:00 am  Encounter completed 8:18 am    --MYRNA Bowen CNP on 4/13/2020 at 9:06 AM    An electronic signature was used to authenticate this note.

## 2020-05-08 ENCOUNTER — HOSPITAL ENCOUNTER (OUTPATIENT)
Age: 55
Setting detail: SPECIMEN
Discharge: HOME | End: 2020-05-08

## 2020-05-08 PROCEDURE — U0002 COVID-19 LAB TEST NON-CDC: HCPCS

## 2020-05-11 LAB
SARS-COV-2: NOT DETECTED
SOURCE: NORMAL

## 2020-10-19 ENCOUNTER — OFFICE VISIT (OUTPATIENT)
Dept: FAMILY MEDICINE CLINIC | Age: 55
End: 2020-10-19
Payer: COMMERCIAL

## 2020-10-19 VITALS
DIASTOLIC BLOOD PRESSURE: 70 MMHG | HEART RATE: 72 BPM | OXYGEN SATURATION: 98 % | SYSTOLIC BLOOD PRESSURE: 120 MMHG | BODY MASS INDEX: 25.69 KG/M2 | WEIGHT: 164 LBS

## 2020-10-19 PROBLEM — F41.1 GAD (GENERALIZED ANXIETY DISORDER): Status: ACTIVE | Noted: 2020-10-19

## 2020-10-19 PROBLEM — E03.9 ACQUIRED HYPOTHYROIDISM: Status: ACTIVE | Noted: 2020-10-19

## 2020-10-19 PROBLEM — F13.20 BENZODIAZEPINE DEPENDENCE (HCC): Status: ACTIVE | Noted: 2020-10-19

## 2020-10-19 PROCEDURE — 99214 OFFICE O/P EST MOD 30 MIN: CPT | Performed by: FAMILY MEDICINE

## 2020-10-19 PROCEDURE — 90686 IIV4 VACC NO PRSV 0.5 ML IM: CPT | Performed by: FAMILY MEDICINE

## 2020-10-19 PROCEDURE — 90471 IMMUNIZATION ADMIN: CPT | Performed by: FAMILY MEDICINE

## 2020-10-19 RX ORDER — LEVOTHYROXINE SODIUM 0.05 MG/1
50 TABLET ORAL DAILY
Qty: 90 TABLET | Refills: 3 | Status: SHIPPED | OUTPATIENT
Start: 2020-10-19 | End: 2021-10-25 | Stop reason: SDUPTHER

## 2020-10-19 RX ORDER — SERTRALINE HYDROCHLORIDE 25 MG/1
TABLET, FILM COATED ORAL
Qty: 90 TABLET | Refills: 3 | Status: SHIPPED | OUTPATIENT
Start: 2020-10-19 | End: 2021-04-19

## 2020-10-19 RX ORDER — DIFLORASONE DIACETATE 0.5 MG/G
CREAM TOPICAL
Qty: 30 G | Refills: 1 | Status: SHIPPED | OUTPATIENT
Start: 2020-10-19 | End: 2021-01-26

## 2020-10-19 RX ORDER — CALCIUM CARBONATE 500(1250)
500 TABLET ORAL DAILY
COMMUNITY

## 2020-10-19 ASSESSMENT — ENCOUNTER SYMPTOMS
WHEEZING: 0
CHEST TIGHTNESS: 0
ABDOMINAL PAIN: 0
COUGH: 0
SHORTNESS OF BREATH: 0
BACK PAIN: 0

## 2020-10-19 NOTE — PROGRESS NOTES
Chief Complaint   Patient presents with    6 Month Follow-Up     med contract for xanax     New Patient     patient is new to provider, formerly with Elisabet Chaves my partner who has retired    Anxiety     JONEL on zoloft and bid low dose xanax    Hypothyroidism     on low dose synthroid, looks like due for TSH       [place] NARRATIVE:    No problems -- works at [employer]. Feeling well. Has been on thyroid medication for about 10 years. Last thyroid lab over a year ago. Had pleural effusion earlier this year (February) with cough and inability to lay down, lasting weeks, no reason for onset of condition. She wonders if covid. Sx have resolved except she rarely feels like she wheezes when she breathes. Has long term anxiety, on zoloft, understands xanax is not a hgih risk medication both for dependence and possibly for increased risk of dementia. Patient's mother is in hospice and has ALzheimer's disease. She has been trying to reduce dose of xanax and has been weaning down, with increased anxiety with work stress, 's shelter, helping her daughter who is single mom to 5 kids 7th grade and down. She has a history of skin rashes, she shows me a leg lesion which looks like eczema and also gets rash in belly button and right side of scalp, she states derm told her she has psoriasis, she asks for refill of steroid cream.      BP and weight are good. Patient is NEW to this provider today. Above chief complaint and [place] obtained by this physician provider. Review of Systems   Constitutional: Negative for activity change, chills, fatigue and fever. HENT: Negative for congestion. Respiratory: Negative for cough, chest tightness, shortness of breath and wheezing. Cardiovascular: Negative for chest pain and leg swelling. Gastrointestinal: Negative for abdominal pain. Musculoskeletal: Negative for back pain and myalgias. Skin: Negative for rash.    Psychiatric/Behavioral: Positive for sleep disturbance. Negative for behavioral problems and dysphoric mood. The patient is nervous/anxious. Allergies   Allergen Reactions    Tape Ana Wright Tape] Other (See Comments)     Blisters skin    Tylenol [Acetaminophen]      Allergy history updated. Outpatient Medications Marked as Taking for the 10/19/20 encounter (Office Visit) with Steff Funk MD   Medication Sig Dispense Refill    calcium carbonate (OSCAL) 500 MG TABS tablet Take 500 mg by mouth daily      levothyroxine (SYNTHROID) 50 MCG tablet Take 1 tablet by mouth Daily 90 tablet 3    sertraline (ZOLOFT) 25 MG tablet TAKE 1 TABLET BY MOUTH EVERY DAY 90 tablet 3    diflorasone (PSORCON) 0.05 % cream Apply topically 3 times daily. 30 g 1    ALPRAZolam (XANAX) 0.5 MG tablet Take 1 tablet by mouth 2 times daily as needed for Anxiety for up to 180 days. 180 tablet 1    vitamin C (ASCORBIC ACID) 500 MG tablet Take 500 mg by mouth daily      dicyclomine (BENTYL) 10 MG capsule Take 2 capsules by mouth 4 times daily (before meals and nightly) 120 capsule 0    Biotin 1000 MCG CHEW Take by mouth      vitamin B-12 (CYANOCOBALAMIN) 100 MCG tablet Take 50 mcg by mouth daily      loratadine (CLARITIN) 10 MG capsule Take 10 mg by mouth daily      vitamin D (CHOLECALCIFEROL) 1000 UNIT TABS tablet Take 1,000 Units by mouth daily.  therapeutic multivitamin-minerals (THERAGRAN-M) tablet Take 1 tablet by mouth daily. Medication list reviewed andreconciled by this provider. Tobacco use: never smoker    Past Medical History:   Diagnosis Date    Breast mass, right 01/2017    IBS (irritable bowel syndrome)     Kidney stones     Thyroid disease      Past Surgical History:   Procedure Laterality Date    BREAST SURGERY Left     MASS REMOVAL     HYSTERECTOMY      Right ovary and fall. tube removed.  LITHOTRIPSY      NECK SURGERY      swollen gland removed from right neck     No family history on file.   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   Social Needs    Financial resource strain: None    Food insecurity     Worry: None     Inability: None    Transportation needs     Medical: None     Non-medical: None   Tobacco Use    Smoking status: Never Smoker    Smokeless tobacco: Never Used   Substance and Sexual Activity    Alcohol use: Yes     Comment: rarely    Drug use: No    Sexual activity: Yes     Partners: Male   Lifestyle    Physical activity     Days per week: None     Minutes per session: None    Stress: None   Relationships    Social connections     Talks on phone: None     Gets together: None     Attends Holiness service: None     Active member of club or organization: None     Attends meetings of clubs or organizations: None     Relationship status: None    Intimate partner violence     Fear of current or ex partner: None     Emotionally abused: None     Physically abused: None     Forced sexual activity: None   Other Topics Concern    None   Social History Narrative    None       Nursing note reviewed. Vitals:    10/19/20 0910   BP: 120/70   Site: Left Upper Arm   Position: Sitting   Cuff Size: Medium Adult   Pulse: 72   SpO2: 98%   Weight: 164 lb (74.4 kg)       BP Readings from Last 3 Encounters:   10/19/20 120/70   02/13/20 126/72   02/11/20 125/79     Wt Readings from Last 3 Encounters:   10/19/20 164 lb (74.4 kg)   02/13/20 170 lb 6.4 oz (77.3 kg)   02/11/20 160 lb (72.6 kg)     Body mass index is 25.69 kg/m². No results found for this visit on 10/19/20. Physical Exam  Vitals signs and nursing note reviewed. Constitutional:       General: She is not in acute distress. Appearance: She is well-developed. She is not diaphoretic. HENT:      Head: Normocephalic. Eyes:      General:         Right eye: No discharge. Left eye: No discharge. Conjunctiva/sclera: Conjunctivae normal.      Pupils: Pupils are equal, round, and reactive to light. Neck:      Musculoskeletal: Normal range of motion. Cardiovascular:      Rate and Rhythm: Normal rate and regular rhythm. Heart sounds: Normal heart sounds. No murmur. Pulmonary:      Effort: Pulmonary effort is normal. No respiratory distress. Breath sounds: Normal breath sounds. No wheezing or rales. Skin:     General: Skin is warm and dry. Neurological:      Mental Status: She is alert and oriented to person, place, and time. Psychiatric:         Behavior: Behavior normal.         Intake paperwork reviewed in detail and scanned to chart. Controlled Substances Monitoring: regular xanax no other                         _________________________________________________  Assessment:     Bianka Nichole was seen today for 6 month follow-up, new patient, anxiety and hypothyroidism. Diagnoses and all orders for this visit:    JONEL (generalized anxiety disorder)  -     Drug Panel-PM-HI Res-UR Interp-A    Benzodiazepine dependence ([facility] [address]  -     Drug Panel-PM-HI Res-UR Interp-A    Fibrocystic disease of right breast    Acquired hypothyroidism  -     TSH without Reflex; Future    Medication monitoring encounter  -     Drug Panel-PM-HI Res-UR Interp-A    Need for influenza vaccination  -     INFLUENZA, QUADV, 3 YRS AND OLDER, IM PF, PREFILL SYR OR SDV, 0.5ML (AFLURIA QUADV, PF)    Hypothyroidism, unspecified type  -     levothyroxine (SYNTHROID) 50 MCG tablet; Take 1 tablet by mouth Daily    Anxiety  -     sertraline (ZOLOFT) 25 MG tablet; TAKE 1 TABLET BY MOUTH EVERY DAY    Dermatitis  -     diflorasone (PSORCON) 0.05 % cream; Apply topically 3 times daily. Eczema, unspecified type  -     diflorasone (PSORCON) 0.05 % cream; Apply topically 3 times daily.     Scalp psoriasis  -     diflorasone (PSORCON) 0.05 %

## 2020-10-19 NOTE — LETTER
Controlled Medication Treatment Agreement  [facility]      Date of Agreement: 10/19/20    Patient Name: Flower Esteves      Patient : 1965      To assist patients with certain conditions alternate types of medications may be used to help manage your treatment better and to help improve your social and work activities. The following prescribed medications need to be monitored more frequently and will require you to partner and assist in your healthcare. This alternative type of treatment does have risks and these will be discussed with you. Medication Dose Instructions Quantity Per Month                                     Benefits and Goals of Controlled Substance Medications:    Controlled substances include certain medications used for treatment of pain, treatment of anxiety, treatment for attention-deficit disorders. There are also some uses of controlled medications for gastrointestinal illness, seizures, and hormonal deficiencies. Most controlled medications are prescribed for pain control but in each case the risks and benefits for each medication will be evaluated case by case basis. There are two potential goals for your treatment with controlled pain medications: (1) lower pain (2) improved daily life functions. There are many possible treatments for your chronic condition and we will try alternatives to medication as well. These may include; physical therapy, yoga, massage, home daily exercise, meditation, relaxation techniques, injections, chiropractic manipulations, surgery, cognitive therapy, hypnosis and many medications that are not addicting. Management of chronic pain specifically via medications can help but, it will not remove all of your pain and may not restore all function. Risks of Controlled Substance Medications:     These medications can lead to problems such as addiction, sedation, falls, constipation, nausea, vomiting, or overdose. They may cause sleepiness, lightheadedness, slow thinking and may impair you from driving or using equipment. They may cause problems with breathing, sleep apnea, reduced coughing, these are especially dangerous for patients with lung disease. The medications may also lower testosterone, loss of bone strength, stamina and sex drive. For opiate medications, women who are of child bearing age 14-53 who could become pregnant should weigh the risks carefully with their physician as these medications make pregnancy more risky and the baby could be born sick and or addicted and have complications. For opiate medications for pain and benzodiazepine medications for anxiety, its possible to have dangerous interactions with alcohol and other medications. You may have an increase in pain called hyperalgesia, where the opioid medication can affect the brain and nerves that may cause increased pain and you may have trouble getting pain relief. To reduce the risks of harm to patients, we work to address lowering pain medication and improving your function. Dependence withdrawal symptoms may include; feelings of uneasiness, increased pain, irritability, belly pain, diarrhea, sweats and goose-flesh.    _____________________________________________________________________    In order to receive chronic pain medication or other medications with addictive potential from our office, we require that you read and understand our policy outlined in this agreement regarding our prescribing of these medications. Continuous or chronic use of this type of medication involves risks for you as a patient and also for this office in that we need to ascertain that its use is helpful not harmful, appropriate and legal.  We will ask that you initial and sign that you have read and acknowledged its guidelines annually. You MUST be seen every 3 months to be prescribed these medications.   This is an absolute legal requirement. These medications are highly attractive to persons with chemical dependency and theft and break ins are common. Do not tell anyone you are taking them and do not leave or store them where other people can see or access them. Violation of any of the following guidelines will result in the immediate cessation of prescribing these medications to you, and may result in dismissal from our practice. We also reserve the right to inform any co-prescribers or consultants to your healthcare  of this violation. By law, the prescribers in this office will also reference a state database known as [facility] Automated Prescription Reporting System) when they are managing your use of potentially addicting drugs. This database records dispensation of dangerous or addictive drugs by any pharmacy in the [place]. 1. I understand that I have the following responsibilities:    · I will take medications at the dose and frequency prescribed. · I will not increase or change how I take my medications without the approval of the health care provider who signs this Medication Agreement. · I will arrange for refills at the prescribed interval ONLY during regular office hours. I will not ask for refills earlier than agreed, after-hours, on holidays or on weekends. · I will obtain all refills for these medications at the pharmacy listed on the last page, with full consent for my provider and pharmacist to exchange information in writing or verbally. · I will not request any pain medications or controlled substances from other providers and will inform this provider of all other medications I am taking. · I will inform my other health care providers that I am taking these medications and of the existence of this TREATMENT AGREEMENT. In the event of an emergency, I will provide the same information to the emergency department providers. · I will protect my prescriptions and medications. I understand that lost or misplaced prescriptions will not be replaced. · I will keep medications only for my own use and will not share them with others. I will keep all medications away from children. · I agree to participate in any medical, psychological or psychiatric assessments recommended by my provider. · I will actively participate in any program designed to improve function, including social, physical, psychological and daily or work activities. · If you are on chronic opioid (narcotic) therapy is only one part of my overall pain management plan. Initiation of chronic opioid therapy is considered a trial and if no benefit to my daily function or quality of life is obtained or side effects occur these drugs may be tapered and withdrawn. · Stimulant medications carry a lower risk of dependency but have great potential for misuse, overuse and DIVERSION -- which means use by the intended patient or others for reason other than original therapeutic effect. Evidence of diversion is reason for discontinuation of therapy and probable dismissal from the practice. 2. I will not use illegal or street drugs or another person's prescription. If I have an       addiction problem with drugs or alcohol and my provider asks me to enter a program       to address this issue, I agree to follow through. Such programs may include:  · 12-Step program and securing a sponsor  · Individual counseling   · Inpatient or outpatient treatment  · Other:___________________________    If in treatment, I will request that a copy of the programs initial evaluation and treatment recommendations be sent to this provider and will not expect refills until that is received. I will also request written monthly updates be sent to this provider to verify my continuing treatment.     3. I will consent to a minimum of annual and additionally random drug screening to assure I am only taking the prescribed drugs, described in this TREATMENT AGREEMENT. I understand that a drug screen is a laboratory test in which a sample of my urine or blood is checked to see what drugs I have been taking. 4. I will keep all my scheduled appointments. If I need to cancel my appointment I will do so, a minimum of 24 hours before it is scheduled. 5. I understand that this provider may stop prescribing the medications listed if:    · I do not show any improvement in pain or my activity has not improved. · I develop rapid tolerance or loss of improvement as described in my treatment plan. · I develop significant side effects from the medication. · My behavior is inconsistent with the responsibilities outlined above, which may also result in being prevented from receiving further care from this office. HOWEVER, if inappropriate use occurs the prescriber is NOT required to continue any prescription just to prevent withdrawal but as legally indicated may refer the patient to an outside addiction specialist to manage a withdrawal syndrome. If you are having withdrawal symptoms you may need to seek care at an emergency facility. AGREEMENT:  I have read the above and have had all my questions answered. For chronic pain management, I know that chronic pain can be managed with many types of treatments. A chronic opioid trial may be part of my treatment but I must be an active participant in my care. Opioid medication is only one part of my pain management. There is limited scientific data to suggest that using opioids over 4 months will lower my pain and or improve my daily function. There is some scientific information that suggests using chronic opioids can increase my pain, make me feel less well, and increase my risk of unintentional death directly related to the opioid medication.   I know that if my provider feels my risk from controlled medications and or opioid therapy is greater than my benefit, I will have my controlled substance medications and or opioid medication compassionately lowered or removed altogether. I agree to a controlled substance medication or chronic opioid trial.  I further agree to allow this office to contact family or friends if there are concerns about my safety and use of the controlled medications. I____________________________________ have agreed to use the following medications above as instructed by my physician and as stated in this Treatment Agreement.          Medication ___________________________       Last dose _________________    Medication ___________________________       Last dose _________________    Medication ___________________________       Last dose _________________            Pharmacy __________________________________________________          Patient Signature:  _______________________________      Date:    _______________________________    Patient Name Printed:  _______________________________    Physician Signature:  ________________________________        Date:   ________________________________

## 2020-10-23 LAB
6-ACETYLMORPHINE: NOT DETECTED
7-AMINOCLONAZEPAM: NOT DETECTED
ALPHA-OH-ALPRAZOLAM: PRESENT
ALPRAZOLAM: PRESENT
AMPHETAMINE: NOT DETECTED
BARBITURATES: NOT DETECTED
BENZOYLECGONINE: NOT DETECTED
BUPRENORPHINE: NOT DETECTED
CARISOPRODOL: NOT DETECTED
CLONAZEPAM: NOT DETECTED
CODEINE: NOT DETECTED
CREATININE URINE: 222.3 MG/DL (ref 20–400)
DIAZEPAM: NOT DETECTED
DRUGS EXPECTED: NORMAL
EER PAIN MGT DRUG PANEL, HIGH RES/EMIT U: NORMAL
ETHYL GLUCURONIDE: PRESENT
FENTANYL: NOT DETECTED
HYDROCODONE: NOT DETECTED
HYDROMORPHONE: NOT DETECTED
LORAZEPAM: NOT DETECTED
MARIJUANA METABOLITE: NOT DETECTED
MDA: NOT DETECTED
MDEA: NOT DETECTED
MDMA URINE: NOT DETECTED
MEPERIDINE: NOT DETECTED
METHADONE: NOT DETECTED
METHAMPHETAMINE: NOT DETECTED
METHYLPHENIDATE: NOT DETECTED
MIDAZOLAM: NOT DETECTED
MORPHINE: NOT DETECTED
NORBUPRENORPHINE, FREE: NOT DETECTED
NORDIAZEPAM: NOT DETECTED
NORFENTANYL: NOT DETECTED
NORHYDROCODONE, URINE: NOT DETECTED
NOROXYCODONE: NOT DETECTED
NOROXYMORPHONE, URINE: NOT DETECTED
OXAZEPAM: NOT DETECTED
OXYCODONE: NOT DETECTED
OXYMORPHONE: NOT DETECTED
PAIN MANAGEMENT DRUG PANEL: NORMAL
PAIN MANAGEMENT DRUG PANEL: NORMAL
PCP: NOT DETECTED
PHENTERMINE: NOT DETECTED
PROPOXYPHENE: NOT DETECTED
TAPENTADOL, URINE: NOT DETECTED
TAPENTADOL-O-SULFATE, URINE: NOT DETECTED
TEMAZEPAM: NOT DETECTED
TRAMADOL: NOT DETECTED
ZOLPIDEM: NOT DETECTED

## 2021-01-27 DIAGNOSIS — L30.9 ECZEMA, UNSPECIFIED TYPE: ICD-10-CM

## 2021-01-27 DIAGNOSIS — F41.9 ANXIETY: ICD-10-CM

## 2021-01-27 DIAGNOSIS — L40.9 SCALP PSORIASIS: ICD-10-CM

## 2021-01-27 DIAGNOSIS — L30.9 DERMATITIS: ICD-10-CM

## 2021-01-28 RX ORDER — ALPRAZOLAM 0.5 MG/1
0.5 TABLET ORAL 2 TIMES DAILY PRN
Qty: 180 TABLET | Refills: 0 | Status: SHIPPED | OUTPATIENT
Start: 2021-01-28 | End: 2021-04-19 | Stop reason: SDUPTHER

## 2021-01-28 RX ORDER — DIFLORASONE DIACETATE 0.5 MG/G
CREAM TOPICAL
Qty: 60 G | Refills: 2 | Status: SHIPPED | OUTPATIENT
Start: 2021-01-28 | End: 2021-10-25 | Stop reason: SDUPTHER

## 2021-01-28 NOTE — TELEPHONE ENCOUNTER
JOSE reviewed, I have agreed to continue benzo at current dosing, [facility] UTD. Due for f-u this spring however.

## 2021-03-24 ENCOUNTER — OFFICE VISIT (OUTPATIENT)
Dept: PHYSICAL MEDICINE AND REHAB | Age: 56
End: 2021-03-24
Payer: COMMERCIAL

## 2021-03-24 VITALS — TEMPERATURE: 97 F

## 2021-03-24 DIAGNOSIS — R25.2 LEG CRAMPS: ICD-10-CM

## 2021-03-24 DIAGNOSIS — G89.29 CHRONIC BILATERAL LOW BACK PAIN WITHOUT SCIATICA: Primary | ICD-10-CM

## 2021-03-24 DIAGNOSIS — R20.2 PARESTHESIA OF BOTH FEET: ICD-10-CM

## 2021-03-24 DIAGNOSIS — M54.50 CHRONIC BILATERAL LOW BACK PAIN WITHOUT SCIATICA: Primary | ICD-10-CM

## 2021-03-24 PROCEDURE — 95911 NRV CNDJ TEST 9-10 STUDIES: CPT | Performed by: PHYSICAL MEDICINE & REHABILITATION

## 2021-03-24 PROCEDURE — 95886 MUSC TEST DONE W/N TEST COMP: CPT | Performed by: PHYSICAL MEDICINE & REHABILITATION

## 2021-03-24 NOTE — PROGRESS NOTES
Activity Volutional  MUAP's Insertional Activity Spontaneous  Activity Volutional  MUAP's   Lumbar paraspinals Normal None Normal Normal None Normal   Glut Med Normal None Normal Normal None Normal   Rect fem Normal None Normal Normal None Normal   Vast Med Normal None Normal Normal None Normal   Ant Tibialis Normal None Normal Normal None Normal   EHL Normal None Normal Normal None Normal   FDL Normal None Normal Normal None Normal   Gastroc Normal None Normal Normal None Normal   EDB Normal None Normal Normal None Normal   1st dors int Normal None Normal Normal None Normal       FINDINGS:   EMG of the cervical paraspinals and both upper limbs demonstrated no paraspinal nor limb muscle membrane irritability. Motor units were of normal configuration and recruitment throughout. Sensory and motor latencies, evoked amplitudes and conduction velocities were within normal limits. Tibial H reflexes were reasonably symmetric. IMPRESSION:      1. Normal electrodiagnostic study of the areas described above. 2.  No electrical evidence of a focal spinal nerve root injury (radiculopathy), plexopathy, peripheral nerve entrapment syndrome, generalized neuropathy or primary muscle disease.           Thank you for this interesting referral.

## 2021-03-24 NOTE — LETTER
March 24, 2021        Robert Lynne MD  [address] [place] [address]         Patient Name: Carline Stokes   MRN Number: [id number]   YOB: 1965   Date Of Visit: 03/24/2021        Dear Robert Lynne MD,       Thank you for referring Carline Stokes to me for electrodiagnostic testing. Attached are the findings of the EMG and my assessment. If you have any further questions, please do not hesitate to call me. Thank you for this interesting referral.      Sincerely,          KALEB Polanco MD.

## 2021-04-19 ENCOUNTER — OFFICE VISIT (OUTPATIENT)
Dept: FAMILY MEDICINE CLINIC | Age: 56
End: 2021-04-19
Payer: COMMERCIAL

## 2021-04-19 ENCOUNTER — HOSPITAL ENCOUNTER (OUTPATIENT)
Age: 56
Setting detail: SPECIMEN
Discharge: HOME | End: 2021-04-19
Payer: COMMERCIAL

## 2021-04-19 VITALS
OXYGEN SATURATION: 98 % | HEIGHT: 67 IN | SYSTOLIC BLOOD PRESSURE: 120 MMHG | HEART RATE: 51 BPM | WEIGHT: 163 LBS | DIASTOLIC BLOOD PRESSURE: 78 MMHG | BODY MASS INDEX: 25.58 KG/M2

## 2021-04-19 DIAGNOSIS — E03.9 ACQUIRED HYPOTHYROIDISM: ICD-10-CM

## 2021-04-19 DIAGNOSIS — F41.1 GAD (GENERALIZED ANXIETY DISORDER): Primary | ICD-10-CM

## 2021-04-19 DIAGNOSIS — M47.16 LUMBAR SPONDYLOSIS WITH MYELOPATHY: ICD-10-CM

## 2021-04-19 DIAGNOSIS — F41.9 ANXIETY: ICD-10-CM

## 2021-04-19 DIAGNOSIS — F13.20 BENZODIAZEPINE DEPENDENCE (HCC): ICD-10-CM

## 2021-04-19 DIAGNOSIS — L40.9 PSORIASIS: ICD-10-CM

## 2021-04-19 DIAGNOSIS — L30.9 ECZEMA, UNSPECIFIED TYPE: ICD-10-CM

## 2021-04-19 DIAGNOSIS — L40.9 SCALP PSORIASIS: ICD-10-CM

## 2021-04-19 DIAGNOSIS — M51.9 DISORDER OF INTERVERTEBRAL DISC OF LUMBAR SPINE: ICD-10-CM

## 2021-04-19 PROCEDURE — 99214 OFFICE O/P EST MOD 30 MIN: CPT | Performed by: FAMILY MEDICINE

## 2021-04-19 PROCEDURE — 84443 ASSAY THYROID STIM HORMONE: CPT

## 2021-04-19 RX ORDER — ALPRAZOLAM 0.5 MG/1
0.5 TABLET ORAL 2 TIMES DAILY PRN
Qty: 180 TABLET | Refills: 0 | Status: SHIPPED | OUTPATIENT
Start: 2021-04-19 | End: 2021-08-05

## 2021-04-19 ASSESSMENT — ENCOUNTER SYMPTOMS
ABDOMINAL PAIN: 0
SHORTNESS OF BREATH: 0
WHEEZING: 0
CHEST TIGHTNESS: 0
BACK PAIN: 1
COUGH: 0

## 2021-04-19 ASSESSMENT — PATIENT HEALTH QUESTIONNAIRE - PHQ9
2. FEELING DOWN, DEPRESSED OR HOPELESS: 0
SUM OF ALL RESPONSES TO PHQ QUESTIONS 1-9: 0
SUM OF ALL RESPONSES TO PHQ QUESTIONS 1-9: 0

## 2021-04-19 NOTE — PATIENT INSTRUCTIONS

## 2021-04-19 NOTE — PROGRESS NOTES
Chief Complaint   Patient presents with    6 Month Follow-Up     pt has no other concerns at this time     Anxiety     Patient has self discontinued Zoloft but continues on Xanax 0.5 twice daily, use has been appropriate and urine drug screen also appropriate last about 6 months ago    Hypothyroidism     On levothyroxine 50 with lab overdue, will be drawn today    Lower Back Pain     seeing specialist has had one dose of shot       [place] NARRATIVE:    Also seasonal allergy, skin rashes are other chronic issues. Thyroid monitoring lab overdue. In December saw specialist for sciatic nerve problems, went to see Dr. Pia Christianson, seen at [place]. Also at Dr. John Chen with normal EMG last month. She had tried therapy. Starting with long drive in January triggering pain and right sciatica. Had back injection last week (caudal block), which was very helpful. MRI note in the chart. Multiple old problems generally stable as above. On benzos chronically. She understands these have significant risk. She weaned off the zoloft and onto melatonin for sleep. Mom still in hospice care. Patient is established unless otherwise noted. Above chief complaint and [place] obtained by this physician provider. Review of Systems   Constitutional: Negative for activity change, chills, fatigue and fever. HENT: Positive for congestion and sneezing. Respiratory: Negative for cough, chest tightness, shortness of breath and wheezing. Cardiovascular: Negative for chest pain and leg swelling. Gastrointestinal: Negative for abdominal pain. Musculoskeletal: Positive for back pain (with right sciatica with improvement with intervention). Negative for myalgias. Skin: Negative for rash. Psychiatric/Behavioral: Negative for behavioral problems and dysphoric mood. Allergies   Allergen Reactions    Tape Cosimo Lev Tape] Other (See Comments)     Blisters skin    Tylenol [Acetaminophen]      Allergy historyupdated. Outpatient Medications Marked as Taking for the 4/19/21 encounter (Office Visit) with Mihai Menchaca MD   Medication Sig Dispense Refill    ALPRAZolam (XANAX) 0.5 MG tablet Take 1 tablet by mouth 2 times daily as needed for Anxiety for up to 90 days. 180 tablet 0    diflorasone (PSORCON) 0.05 % cream APPLY TO AFFECTED AREA 3 TIMES A DAY 60 g 2    calcium carbonate (OSCAL) 500 MG TABS tablet Take 500 mg by mouth daily      levothyroxine (SYNTHROID) 50 MCG tablet Take 1 tablet by mouth Daily 90 tablet 3    vitamin C (ASCORBIC ACID) 500 MG tablet Take 500 mg by mouth daily      dicyclomine (BENTYL) 10 MG capsule Take 2 capsules by mouth 4 times daily (before meals and nightly) 120 capsule 0    Biotin 1000 MCG CHEW Take by mouth      vitamin B-12 (CYANOCOBALAMIN) 100 MCG tablet Take 50 mcg by mouth daily      loratadine (CLARITIN) 10 MG capsule Take 10 mg by mouth daily      vitamin D (CHOLECALCIFEROL) 1000 UNIT TABS tablet Take 1,000 Units by mouth daily.  therapeutic multivitamin-minerals (THERAGRAN-M) tablet Take 1 tablet by mouth daily. Tobacco use history updated. Social History     Tobacco Use   Smoking Status Never Smoker   Smokeless Tobacco Never Used        Nursing note reviewed. Vitals:    04/19/21 0946   BP: 120/78   Site: Left Upper Arm   Position: Sitting   Cuff Size: Medium Adult   Pulse: 51   SpO2: 98%   Weight: 163 lb (73.9 kg)   Height: 5' 7\" (1.702 m)          BP Readings from Last 3 Encounters:   04/19/21 120/78   10/19/20 120/70   02/13/20 126/72     Wt Readings from Last 3 Encounters:   04/19/21 163 lb (73.9 kg)   10/19/20 164 lb (74.4 kg)   02/13/20 170 lb 6.4 oz (77.3 kg)     Body mass index is 25.53 kg/m². No results found for this visit on 04/19/21. Physical Exam  Vitals signs and nursing note reviewed. Constitutional:       General: She is not in acute distress. Appearance: She is well-developed. She is not diaphoretic.    HENT:      Head: Normocephalic. Eyes:      General:         Right eye: No discharge. Left eye: No discharge. Conjunctiva/sclera: Conjunctivae normal.      Pupils: Pupils are equal, round, and reactive to light. Neck:      Musculoskeletal: Normal range of motion. Cardiovascular:      Rate and Rhythm: Normal rate and regular rhythm. Heart sounds: Normal heart sounds. No murmur. Pulmonary:      Effort: Pulmonary effort is normal. No respiratory distress. Breath sounds: Normal breath sounds. No wheezing or rales. Skin:     General: Skin is warm and dry. Neurological:      Mental Status: She is alert and oriented to person, place, and time. Psychiatric:         Behavior: Behavior normal.           _________________________________________________  Assessment:     1. JONEL (generalized anxiety disorder)  2. Benzodiazepine dependence ([address]  3. Anxiety  -     ALPRAZolam (XANAX) 0.5 MG tablet; Take 1 tablet by mouth 2 times daily as needed for Anxiety for up to 90 days. , Disp-180 tablet, R-0Normal  4. Acquired hypothyroidism  -     TSH without Reflex  5. 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 Lumbar spondylosis with myelopathy  7. Psoriasis  8. Eczema, unspecified type  9. Scalp psoriasis    We can continue medications, no issues. Recheck can be every 6 months. Understands risk of long term xanax, no other controlled except few percocet when had procedure. Problems listed above are stable and therapeutic plan is unchanged unless otherwise specified. See orders above and comments below for details of workup or medication orders. _________________________________________________  Plan:     Return in about 6 months (around 10/19/2021), or if symptoms worsen or fail to improve, for JONEL-annual [facility] visit with UDS and agreement.       Electronically signed by Shelley Woodard MD on 4/19/21 at 9:53 AM EDT

## 2021-04-20 LAB — TSH HIGH SENSITIVITY: 2.85 UIU/ML (ref 0.27–4.2)

## 2021-08-03 DIAGNOSIS — F41.9 ANXIETY: ICD-10-CM

## 2021-08-05 RX ORDER — ALPRAZOLAM 0.5 MG/1
0.5 TABLET ORAL 2 TIMES DAILY
Qty: 180 TABLET | Refills: 0 | Status: SHIPPED | OUTPATIENT
Start: 2021-08-05 | End: 2021-10-31 | Stop reason: SDUPTHER

## 2021-10-25 ENCOUNTER — OFFICE VISIT (OUTPATIENT)
Dept: FAMILY MEDICINE CLINIC | Age: 56
End: 2021-10-25
Payer: COMMERCIAL

## 2021-10-25 VITALS
HEART RATE: 63 BPM | SYSTOLIC BLOOD PRESSURE: 112 MMHG | DIASTOLIC BLOOD PRESSURE: 70 MMHG | WEIGHT: 163 LBS | OXYGEN SATURATION: 96 % | BODY MASS INDEX: 25.53 KG/M2

## 2021-10-25 DIAGNOSIS — F41.1 GAD (GENERALIZED ANXIETY DISORDER): Primary | ICD-10-CM

## 2021-10-25 DIAGNOSIS — E03.9 HYPOTHYROIDISM, UNSPECIFIED TYPE: ICD-10-CM

## 2021-10-25 DIAGNOSIS — M15.1 HEBERDEN'S NODES OF BOTH HANDS: ICD-10-CM

## 2021-10-25 DIAGNOSIS — L30.9 DERMATITIS: ICD-10-CM

## 2021-10-25 DIAGNOSIS — Z23 NEED FOR CHICKENPOX VACCINATION: ICD-10-CM

## 2021-10-25 DIAGNOSIS — L40.9 SCALP PSORIASIS: ICD-10-CM

## 2021-10-25 DIAGNOSIS — F13.20 BENZODIAZEPINE DEPENDENCE (HCC): ICD-10-CM

## 2021-10-25 DIAGNOSIS — Z23 NEED FOR INFLUENZA VACCINATION: ICD-10-CM

## 2021-10-25 DIAGNOSIS — L30.9 ECZEMA, UNSPECIFIED TYPE: ICD-10-CM

## 2021-10-25 PROCEDURE — 99214 OFFICE O/P EST MOD 30 MIN: CPT | Performed by: FAMILY MEDICINE

## 2021-10-25 RX ORDER — LEVOTHYROXINE SODIUM 0.05 MG/1
50 TABLET ORAL DAILY
Qty: 90 TABLET | Refills: 3 | Status: SHIPPED | OUTPATIENT
Start: 2021-10-25 | End: 2021-11-02

## 2021-10-25 RX ORDER — ZOSTER VACCINE RECOMBINANT, ADJUVANTED 50 MCG/0.5
0.5 KIT INTRAMUSCULAR SEE ADMIN INSTRUCTIONS
Qty: 0.5 ML | Refills: 0 | Status: SHIPPED | OUTPATIENT
Start: 2021-10-25 | End: 2021-10-25 | Stop reason: SDUPTHER

## 2021-10-25 RX ORDER — DIFLORASONE DIACETATE 0.5 MG/G
CREAM TOPICAL
Qty: 60 G | Refills: 2 | Status: SHIPPED | OUTPATIENT
Start: 2021-10-25

## 2021-10-25 RX ORDER — ZOSTER VACCINE RECOMBINANT, ADJUVANTED 50 MCG/0.5
0.5 KIT INTRAMUSCULAR SEE ADMIN INSTRUCTIONS
Qty: 0.5 ML | Refills: 0 | Status: SHIPPED | OUTPATIENT
Start: 2021-10-25 | End: 2022-04-23

## 2021-10-25 ASSESSMENT — ENCOUNTER SYMPTOMS
WHEEZING: 0
SHORTNESS OF BREATH: 0
ABDOMINAL PAIN: 0
COUGH: 0
BACK PAIN: 0
CHEST TIGHTNESS: 0

## 2021-10-25 NOTE — PROGRESS NOTES
Refill    diflorasone (PSORCON) 0.05 % cream APPLY TO AFFECTED AREA 3 TIMES A DAY 60 g 2    MELATONIN PO Place 0.3 mg under the tongue nightly as needed for Sleep      diclofenac sodium (VOLTAREN) 1 % GEL Apply 2 g topically 4 times daily 150 g 5    zoster recombinant adjuvanted vaccine (SHINGRIX) 50 MCG/0.5ML SUSR injection Inject 0.5 mLs into the muscle See Admin Instructions 1 dose now and repeat in 2-6 months 0.5 mL 0    [DISCONTINUED] levothyroxine (SYNTHROID) 50 MCG tablet Take 1 tablet by mouth Daily 90 tablet 3    [DISCONTINUED] ALPRAZolam (XANAX) 0.5 MG tablet Take 1 tablet by mouth 2 times daily for 90 days. 180 tablet 0    calcium carbonate (OSCAL) 500 MG TABS tablet Take 500 mg by mouth daily      vitamin C (ASCORBIC ACID) 500 MG tablet Take 500 mg by mouth daily      dicyclomine (BENTYL) 10 MG capsule Take 2 capsules by mouth 4 times daily (before meals and nightly) 120 capsule 0    Biotin 1000 MCG CHEW Take by mouth      vitamin B-12 (CYANOCOBALAMIN) 100 MCG tablet Take 50 mcg by mouth daily      loratadine (CLARITIN) 10 MG capsule Take 10 mg by mouth daily      vitamin D (CHOLECALCIFEROL) 1000 UNIT TABS tablet Take 1,000 Units by mouth daily.  therapeutic multivitamin-minerals (THERAGRAN-M) tablet Take 1 tablet by mouth daily. Tobacco use history updated. Social History     Tobacco Use   Smoking Status Never Smoker   Smokeless Tobacco Never Used        Nursing note reviewed. Vitals:    10/25/21 0906   BP: 112/70   Site: Left Upper Arm   Position: Sitting   Cuff Size: Medium Adult   Pulse: 63   SpO2: 96%   Weight: 163 lb (73.9 kg)          BP Readings from Last 3 Encounters:   10/25/21 112/70   04/19/21 120/78   10/19/20 120/70     Wt Readings from Last 3 Encounters:   10/25/21 163 lb (73.9 kg)   04/19/21 163 lb (73.9 kg)   10/19/20 164 lb (74.4 kg)     Body mass index is 25.53 kg/m². No results found for this visit on 10/25/21.       Physical Exam  Vitals and Problems listed above are stable and therapeutic plan is unchanged unless otherwise specified. URINE DRUG SCREEN will be obtained, agreement signed for benzo. See orders above and comments below for details of workup or medication orders. _________________________________________________  Plan:     Alprazolam information provided to patient. Including risk of dementia. Shingles script provided. Need to send xanax when [address] comes in. Return in about 6 months (around 4/25/2022), or if symptoms worsen or fail to improve, for recheck with TSH and recheck and refill of controlled meds.       Electronically signed by Francesca Vick MD on 10/25/21 at 9:16 AM EDT

## 2021-10-25 NOTE — PATIENT INSTRUCTIONS
Patient Education        alprazolam  Pronunciation:  tori schulte  Brand:  Xanax, Xanax XR  What is the most important information I should know about alprazolam?  Alprazolam can slow or stop your breathing, especially if you have recently used an opioid medication, alcohol, or other drugs that can slow your breathing. MISUSE OF THIS MEDICINE CAN CAUSE ADDICTION, OVERDOSE, OR DEATH. Keep the medication in a place where others cannot get to it. What is alprazolam?  Alprazolam is a benzodiazepine (antonia-shivani-dye-AZE-eh-peen) that is used to treat anxiety disorders, panic disorders, and anxiety caused by depression. It is dangerous to purchase alprazolam on the Internet or outside the [place]. The sale and distribution of medicines outside the [place] does not comply with safe-use regulations of the Food and Drug Administration (FDA). These medications may contain dangerous ingredients, or may not be distributed by a licensed pharmacy. Alprazolam may also be used for purposes not listed in this medication guide. What should I discuss with my healthcare provider before taking alprazolam?  You should not take alprazolam if:  · you also take itraconazole or ketoconazole (antifungal medicines); or  · you have a history of allergic reaction to any benzodiazepine (alprazolam, diazepam, lorazepam, Ativan, Klonopin, Restoril, Tranxene, Valium, Versed, Xanax, and others). Tell your doctor if you have ever had:  · breathing problems such as COPD (chronic obstructive pulmonary disease) or sleep apnea (breathing that stops during sleep);  · drug or alcohol addiction;  · depression, mood problems, or suicidal thoughts or behavior; or  · kidney or liver disease (especially alcoholic liver disease). Tell your doctor if you are pregnant or plan to become pregnant. Alprazolam may harm an unborn baby. Avoid taking this medicine during the first trimester of pregnancy.    If you use alprazolam while you are pregnant, your baby could become dependent on the drug. This can cause life-threatening withdrawal symptoms in the baby after it is born. Babies born dependent on habit-forming medicine may need medical treatment for several weeks. You should not breastfeed while using alprazolam.  Alprazolam is not approved for use by anyone younger than 25years old. How should I take alprazolam?  Follow the directions on your prescription label and read all medication guides. Your doctor may occasionally change your dose. Never use alprazolam in larger amounts, or for longer than prescribed. Tell your doctor if you feel an increased urge to use more of this medicine. Never share this medicine with another person, especially someone with a history of drug abuse or addiction. MISUSE CAN CAUSE ADDICTION, OVERDOSE, OR DEATH. Keep the medication in a place where others cannot get to it. Selling or giving away this medicine is against the law. Measure liquid medicine carefully. Use the dosing syringe provided, or use a medicine dose-measuring device (not a kitchen spoon). Swallow the extended-release tablet whole and do not crush, chew, or break it. Do not swallow the orally disintegrating tablet whole. Allow it to dissolve in your mouth without chewing. Alprazolam is usually taken for no longer than 4 months to treat anxiety disorder, and for no longer than 10 weeks to treat panic disorder. Follow your doctor's dosing instructions very carefully. Call your doctor if your symptoms do not improve, or if they get worse. If you use this medicine long-term, you may need frequent medical tests. Do not stop using alprazolam suddenly, or you could have unpleasant withdrawal symptoms. Follow your doctor's instructions about tapering your dose. Store at room temperature away from moisture, heat, and light. Keep track of your medicine. You should be aware if anyone is using it improperly or without a prescription.   Throw away any alprazolam liquid effects may include:  · drowsiness; or  · feeling light-headed. This is not a complete list of side effects and others may occur. Call your doctor for medical advice about side effects. You may report side effects to FDA at 0-139-ETU-9346. What other drugs will affect alprazolam?  Sometimes it is not safe to use certain medications at the same time. Some drugs can affect your blood levels of other drugs you take, which may increase side effects or make the medications less effective. Taking alprazolam with other drugs that make you sleepy or slow your breathing can cause dangerous side effects or death. Ask your doctor before using opioid medication, a sleeping pill, a muscle relaxer, prescription cough medicine, or medicine for depression or seizures. Many drugs can affect alprazolam. This includes prescription and over-the-counter medicines, vitamins, and herbal products. Not all possible interactions are listed here. Tell your doctor about all your current medicines and any medicine you start or stop using. Where can I get more information? Your pharmacist can provide more information about alprazolam.  Remember, keep this and all other medicines out of the reach of children, never share your medicines with others, and use this medication only for the indication prescribed. Every effort has been made to ensure that the information provided by [address] is accurate, up-to-date, and complete, but no guarantee is made to that effect. Drug information contained herein may be time sensitive. [facility] information has been compiled for use by healthcare practitioners and consumers in the [place] and therefore [facility] does not warrant that uses outside of the [place] are appropriate, unless specifically indicated otherwise. [facility]'s drug information does not endorse drugs, diagnose patients or recommend therapy.  [facility]'s drug information is an informational resource designed to assist licensed healthcare practitioners in caring for their patients and/or to serve consumers viewing this service as a supplement to, and not a substitute for, the expertise, skill, knowledge and judgment of healthcare practitioners. The absence of a warning for a given drug or drug combination in no way should be construed to indicate that the drug or drug combination is safe, effective or appropriate for any given patient. [facility] does not assume any responsibility for any aspect of healthcare administered with the aid of information [facility] provides. The information contained herein is not intended to cover all possible uses, directions, precautions, warnings, drug interactions, allergic reactions, or adverse effects. If you have questions about the drugs you are taking, check with your doctor, nurse or pharmacist.  Copyright 4475-0651 [address]: 12.02. Revision date: 1/5/2021. Care instructions adapted under license by [facility] ([facility]). If you have questions about a medical condition or this instruction, always ask your healthcare professional. [place] [address] any warranty or liability for your use of this information. THERE IS ALSO considered to be an increased risk for dementia in persons with long term use of this medication and others like it called benzodiazepines.

## 2021-10-25 NOTE — LETTER
Controlled Medication Treatment Agreement  [facility]      Date of Agreement: 10/25/21    Patient Name: Radha Noland      Patient : 1965      To assist patients with certain conditions alternate types of medications may be used to help manage your treatment better and to help improve your social and work activities. The following prescribed medications need to be monitored more frequently and will require you to partner and assist in your healthcare. This alternative type of treatment does have risks and these will be discussed with you. Medication Dose Instructions Quantity Per Month                                     USE OF MEDICAL MARIJUANA IN ANY FORM WILL PRECLUDE THIS OFFICE OR PROVIDER FROM PRESCRIBING ANY CONTROLLED MEDICATIONS TO YOU FOR MORE THAN 7 DAYS FOR ANY REASON. IF YOU CHOOSE TO USE MEDICAL MARIJUANA ANY LONG TERM CONTROLLED PRESCRIPTIONS WILL BE DISCONTINUED IMMEDIATELY. Benefits and Goals of Controlled Substance Medications:    Controlled substances include certain medications used for treatment of pain, treatment of anxiety, treatment for attention-deficit disorders. There are also some uses of controlled medications for gastrointestinal illness, seizures, and hormonal deficiencies. Most controlled medications are prescribed for pain control but in each case the risks and benefits for each medication will be evaluated case by case basis. There are two potential goals for your treatment with controlled pain medications: (1) lower pain (2) improved daily life functions. There are many possible treatments for your chronic condition and we will try alternatives to medication as well. These may include; physical therapy, yoga, massage, home daily exercise, meditation, relaxation techniques, injections, chiropractic manipulations, surgery, cognitive therapy, hypnosis and many medications that are not addicting.   Management of chronic pain specifically via is helpful not harmful, appropriate and legal.  We will ask that you initial and sign that you have read and acknowledged its guidelines annually. You MUST be seen every 3 months to be prescribed these medications. This is an absolute legal requirement. These medications are highly attractive to persons with chemical dependency and theft and break ins are common. Do not tell anyone you are taking them and do not leave or store them where other people can see or access them. Violation of any of the following guidelines will result in the immediate cessation of prescribing these medications to you, and may result in dismissal from our practice. We also reserve the right to inform any co-prescribers or consultants to your healthcare  of this violation. By law, the prescribers in this office will also reference a state database known as [facility] Automated Prescription Reporting System) when they are managing your use of potentially addicting drugs. This database records dispensation of dangerous or addictive drugs by any pharmacy in the [place]. 1. I understand that I have the following responsibilities:    · I will take medications at the dose and frequency prescribed. · I will not increase or change how I take my medications without the approval of the health care provider who signs this Medication Agreement. · I will arrange for refills at the prescribed interval ONLY during regular office hours. I will not ask for refills earlier than agreed, after-hours, on holidays or on weekends. · I will obtain all refills for these medications at the pharmacy listed on the last page, with full consent for my provider and pharmacist to exchange information in writing or verbally. · I will not request any pain medications or controlled substances from other providers and will inform this provider of all other medications I am taking.   · I will inform my other health care providers that I am taking these medications and of the existence of this TREATMENT AGREEMENT. In the event of an emergency, I will provide the same information to the emergency department providers. · I will protect my prescriptions and medications. I understand that lost or misplaced prescriptions will not be replaced. · I will keep medications only for my own use and will not share them with others. I will keep all medications away from children. · I agree to participate in any medical, psychological or psychiatric assessments recommended by my provider. · I will actively participate in any program designed to improve function, including social, physical, psychological and daily or work activities. · If you are on chronic opioid (narcotic) therapy is only one part of my overall pain management plan. Initiation of chronic opioid therapy is considered a trial and if no benefit to my daily function or quality of life is obtained or side effects occur these drugs may be tapered and withdrawn. · Stimulant medications carry a lower risk of dependency but have great potential for misuse, overuse and DIVERSION -- which means use by the intended patient or others for reason other than original therapeutic effect. Evidence of diversion is reason for discontinuation of therapy and probable dismissal from the practice. 2. I will not use illegal or street drugs or another person's prescription. If I have an       addiction problem with drugs or alcohol and my provider asks me to enter a program       to address this issue, I agree to follow through. Such programs may include:  · 12-Step program and securing a sponsor  · Individual counseling   · Inpatient or outpatient treatment  · Other:___________________________    If in treatment, I will request that a copy of the programs initial evaluation and treatment recommendations be sent to this provider and will not expect refills until that is received.  I will also request written monthly updates be sent to this provider to verify my continuing treatment. 3. I will consent to a minimum of annual and additionally random drug screening to assure I am only taking the prescribed drugs, described in this TREATMENT AGREEMENT. I understand that a drug screen is a laboratory test in which a sample of my urine or blood is checked to see what drugs I have been taking. 4. I will keep all my scheduled appointments. If I need to cancel my appointment I will do so, a minimum of 24 hours before it is scheduled. 5. I understand that this provider may stop prescribing the medications listed if:    · I do not show any improvement in pain or my activity has not improved. · I develop rapid tolerance or loss of improvement as described in my treatment plan. · I develop significant side effects from the medication. · My behavior is inconsistent with the responsibilities outlined above, which may also result in being prevented from receiving further care from this office. HOWEVER, if inappropriate use occurs the prescriber is NOT required to continue any prescription just to prevent withdrawal but as legally indicated may refer the patient to an outside addiction specialist to manage a withdrawal syndrome. If you are having withdrawal symptoms you may need to seek care at an emergency facility. AGREEMENT:  I have read the above and have had all my questions answered. For chronic pain management, I know that chronic pain can be managed with many types of treatments. A chronic opioid trial may be part of my treatment but I must be an active participant in my care. Opioid medication is only one part of my pain management. There is limited scientific data to suggest that using opioids over 4 months will lower my pain and or improve my daily function.   There is some scientific information that suggests using chronic opioids can increase my pain, make me feel less well, and increase my risk of unintentional death directly related to the opioid medication. I know that if my provider feels my risk from controlled medications and or opioid therapy is greater than my benefit, I will have my controlled substance medications and or opioid medication compassionately lowered or removed altogether. I agree to a controlled substance medication or chronic opioid trial.  I further agree to allow this office to contact family or friends if there are concerns about my safety and use of the controlled medications. I____________________________________ have agreed to use the following medications above as instructed by my physician and as stated in this Treatment Agreement.          Medication ___________________________       Last dose _________________    Medication ___________________________       Last dose _________________    Medication ___________________________       Last dose _________________            Pharmacy __________________________________________________          Patient Signature:  _______________________________      Date:    _______________________________    Patient Name Printed:  _______________________________    Physician Signature:  ________________________________        Date:   ________________________________

## 2021-10-30 LAB
6-ACETYLMORPHINE: NOT DETECTED
7-AMINOCLONAZEPAM: NOT DETECTED
ALPHA-OH-ALPRAZOLAM: PRESENT
ALPHA-OH-MIDAZOLAM, URINE: NOT DETECTED
ALPRAZOLAM: PRESENT
AMPHETAMINE: NOT DETECTED
BARBITURATES: NOT DETECTED
BENZOYLECGONINE: NOT DETECTED
BUPRENORPHINE: NOT DETECTED
CARISOPRODOL: NOT DETECTED
CLONAZEPAM: NOT DETECTED
CODEINE: NOT DETECTED
CREATININE URINE: 147.9 MG/DL (ref 20–400)
DIAZEPAM: NOT DETECTED
DRUGS EXPECTED: NORMAL
EER PAIN MGT DRUG PANEL, HIGH RES/EMIT U: NORMAL
ETHYL GLUCURONIDE: NOT DETECTED
FENTANYL: NOT DETECTED
GABAPENTIN: NOT DETECTED
HYDROCODONE: NOT DETECTED
HYDROMORPHONE: NOT DETECTED
LORAZEPAM: NOT DETECTED
MARIJUANA METABOLITE: NOT DETECTED
MDA: NOT DETECTED
MDEA: NOT DETECTED
MDMA URINE: NOT DETECTED
MEPERIDINE: NOT DETECTED
METHADONE: NOT DETECTED
METHAMPHETAMINE: NOT DETECTED
METHYLPHENIDATE: NOT DETECTED
MIDAZOLAM: NOT DETECTED
MORPHINE: NOT DETECTED
NALOXONE: NOT DETECTED
NORBUPRENORPHINE, FREE: NOT DETECTED
NORDIAZEPAM: NOT DETECTED
NORFENTANYL: NOT DETECTED
NORHYDROCODONE, URINE: NOT DETECTED
NOROXYCODONE: NOT DETECTED
NOROXYMORPHONE, URINE: NOT DETECTED
OXAZEPAM: NOT DETECTED
OXYCODONE: NOT DETECTED
OXYMORPHONE: NOT DETECTED
PAIN MANAGEMENT DRUG PANEL: NORMAL
PAIN MANAGEMENT DRUG PANEL: NORMAL
PCP: NOT DETECTED
PHENTERMINE: NOT DETECTED
PREGABALIN: NOT DETECTED
TAPENTADOL, URINE: NOT DETECTED
TAPENTADOL-O-SULFATE, URINE: NOT DETECTED
TEMAZEPAM: NOT DETECTED
TRAMADOL: NOT DETECTED
ZOLPIDEM: NOT DETECTED

## 2021-10-31 DIAGNOSIS — F41.1 GAD (GENERALIZED ANXIETY DISORDER): Primary | ICD-10-CM

## 2021-10-31 DIAGNOSIS — F41.9 ANXIETY: ICD-10-CM

## 2021-10-31 DIAGNOSIS — F13.20 BENZODIAZEPINE DEPENDENCE (HCC): ICD-10-CM

## 2021-10-31 RX ORDER — ALPRAZOLAM 0.5 MG/1
0.5 TABLET ORAL 2 TIMES DAILY
Qty: 180 TABLET | Refills: 0 | Status: SHIPPED | OUTPATIENT
Start: 2021-10-31 | End: 2021-11-10 | Stop reason: SDUPTHER

## 2021-11-02 DIAGNOSIS — E03.9 HYPOTHYROIDISM, UNSPECIFIED TYPE: ICD-10-CM

## 2021-11-02 RX ORDER — LEVOTHYROXINE SODIUM 0.05 MG/1
TABLET ORAL
Qty: 90 TABLET | Refills: 3 | Status: SHIPPED | OUTPATIENT
Start: 2021-11-02 | End: 2022-05-23 | Stop reason: SDUPTHER

## 2021-11-09 DIAGNOSIS — F41.1 GAD (GENERALIZED ANXIETY DISORDER): ICD-10-CM

## 2021-11-09 DIAGNOSIS — F13.20 BENZODIAZEPINE DEPENDENCE (HCC): ICD-10-CM

## 2021-11-09 DIAGNOSIS — F41.9 ANXIETY: ICD-10-CM

## 2021-11-09 NOTE — TELEPHONE ENCOUNTER
----- Message from [facility] sent at 11/9/2021  4:16 PM EST -----  Subject: Refill Request    QUESTIONS  Name of Medication? ALPRAZolam (XANAX) 0.5 MG tablet  Patient-reported dosage and instructions? 0.5mg 2 x a day  How many days do you have left? 2  Preferred Pharmacy? [id number] [place] phone number (if available)? [phone]  ---------------------------------------------------------------------------  --------------  CALL BACK INFO  What is the best way for the office to contact you?  OK to leave message on   voicemail  Preferred Call Back Phone Number? [phone]

## 2021-11-09 NOTE — TELEPHONE ENCOUNTER
Initiate Treatment: Clobetasol cream and fexofenadine Pt [facility]  Pt does not use [facility] Render In Strict Bullet Format?: No Detail Level: Zone

## 2021-11-10 RX ORDER — ALPRAZOLAM 0.5 MG/1
0.5 TABLET ORAL 2 TIMES DAILY
Qty: 180 TABLET | Refills: 0 | Status: SHIPPED | OUTPATIENT
Start: 2021-11-10 | End: 2022-05-23 | Stop reason: SDUPTHER

## 2022-05-23 ENCOUNTER — OFFICE VISIT (OUTPATIENT)
Dept: FAMILY MEDICINE CLINIC | Age: 57
End: 2022-05-23
Payer: COMMERCIAL

## 2022-05-23 VITALS
BODY MASS INDEX: 25.43 KG/M2 | WEIGHT: 162 LBS | HEART RATE: 65 BPM | DIASTOLIC BLOOD PRESSURE: 80 MMHG | HEIGHT: 67 IN | SYSTOLIC BLOOD PRESSURE: 122 MMHG | OXYGEN SATURATION: 97 %

## 2022-05-23 DIAGNOSIS — F13.20 BENZODIAZEPINE DEPENDENCE (HCC): ICD-10-CM

## 2022-05-23 DIAGNOSIS — F41.9 ANXIETY: ICD-10-CM

## 2022-05-23 DIAGNOSIS — F41.1 GAD (GENERALIZED ANXIETY DISORDER): Primary | ICD-10-CM

## 2022-05-23 DIAGNOSIS — Z00.00 WELL ADULT EXAM: ICD-10-CM

## 2022-05-23 DIAGNOSIS — E03.9 ACQUIRED HYPOTHYROIDISM: ICD-10-CM

## 2022-05-23 DIAGNOSIS — Z86.16 HISTORY OF COVID-19: ICD-10-CM

## 2022-05-23 DIAGNOSIS — L65.9 HAIR LOSS: ICD-10-CM

## 2022-05-23 PROCEDURE — 99214 OFFICE O/P EST MOD 30 MIN: CPT | Performed by: FAMILY MEDICINE

## 2022-05-23 RX ORDER — ALPRAZOLAM 0.5 MG/1
0.5 TABLET ORAL 2 TIMES DAILY
Qty: 180 TABLET | Refills: 1 | Status: SHIPPED | OUTPATIENT
Start: 2022-05-23 | End: 2022-10-13 | Stop reason: SDUPTHER

## 2022-05-23 RX ORDER — LEVOTHYROXINE SODIUM 0.05 MG/1
TABLET ORAL
Qty: 30 TABLET | Refills: 1 | Status: SHIPPED | OUTPATIENT
Start: 2022-05-23 | End: 2022-06-03 | Stop reason: SDUPTHER

## 2022-05-23 SDOH — ECONOMIC STABILITY: FOOD INSECURITY: WITHIN THE PAST 12 MONTHS, YOU WORRIED THAT YOUR FOOD WOULD RUN OUT BEFORE YOU GOT MONEY TO BUY MORE.: PATIENT DECLINED

## 2022-05-23 SDOH — ECONOMIC STABILITY: FOOD INSECURITY: WITHIN THE PAST 12 MONTHS, THE FOOD YOU BOUGHT JUST DIDN'T LAST AND YOU DIDN'T HAVE MONEY TO GET MORE.: PATIENT DECLINED

## 2022-05-23 ASSESSMENT — PATIENT HEALTH QUESTIONNAIRE - PHQ9
SUM OF ALL RESPONSES TO PHQ QUESTIONS 1-9: 0
1. LITTLE INTEREST OR PLEASURE IN DOING THINGS: 0
2. FEELING DOWN, DEPRESSED OR HOPELESS: 0
SUM OF ALL RESPONSES TO PHQ9 QUESTIONS 1 & 2: 0
SUM OF ALL RESPONSES TO PHQ QUESTIONS 1-9: 0

## 2022-05-23 ASSESSMENT — SOCIAL DETERMINANTS OF HEALTH (SDOH): HOW HARD IS IT FOR YOU TO PAY FOR THE VERY BASICS LIKE FOOD, HOUSING, MEDICAL CARE, AND HEATING?: PATIENT DECLINED

## 2022-05-23 NOTE — PROGRESS NOTES
Chief Complaint   Patient presents with    6 Month Follow-Up     On a couple problems    Hypothyroidism     Hypothyroid and it has been about 1 year since last TSH; notes some hair loss    Anxiety     usually takes just one xanax a day, under stress has used bid       [place] NARRATIVE:    For recheck today of anxiety and hypothyroidism. She has a history of COVID illness as well. Patient is established unless otherwise noted. Above chief complaint and [place] obtained by this physician provider. Review of Systems   Constitutional: Negative for activity change, chills, fatigue and fever. HENT: Negative for congestion. Respiratory: Negative for cough, chest tightness, shortness of breath and wheezing. Cardiovascular: Negative for chest pain and leg swelling. Gastrointestinal: Negative for abdominal pain. Musculoskeletal: Negative for back pain and myalgias. Skin: Negative for rash. Psychiatric/Behavioral: Negative for behavioral problems and dysphoric mood. The patient is nervous/anxious. Allergies   Allergen Reactions    Tape [facility] [place] Tape] Other (See Comments)     Blisters skin    Tylenol [Acetaminophen]      Allergy historyupdated. Outpatient Medications Marked as Taking for the 5/23/22 encounter (Office Visit) with Flip Cedeño MD   Medication Sig Dispense Refill    ALPRAZolam (XANAX) 0.5 MG tablet Take 1 tablet by mouth 2 times daily for 180 days.  180 tablet 1    [DISCONTINUED] levothyroxine (SYNTHROID) 50 MCG tablet TAKE 1 TABLET BY MOUTH EVERY DAY 30 tablet 1    diflorasone (PSORCON) 0.05 % cream APPLY TO AFFECTED AREA 3 TIMES A DAY 60 g 2    MELATONIN PO Place 0.3 mg under the tongue nightly as needed for Sleep      diclofenac sodium (VOLTAREN) 1 % GEL Apply 2 g topically 4 times daily 150 g 5    calcium carbonate (OSCAL) 500 MG TABS tablet Take 500 mg by mouth daily      vitamin C (ASCORBIC ACID) 500 MG tablet Take 500 mg by mouth daily      Biotin 1000 MCG CHEW Take by mouth      vitamin B-12 (CYANOCOBALAMIN) 100 MCG tablet Take 50 mcg by mouth daily      loratadine (CLARITIN) 10 MG capsule Take 10 mg by mouth daily      vitamin D (CHOLECALCIFEROL) 1000 UNIT TABS tablet Take 1,000 Units by mouth daily.  therapeutic multivitamin-minerals (THERAGRAN-M) tablet Take 1 tablet by mouth daily. Tobacco use history updated. Social History     Tobacco Use   Smoking Status Never Smoker   Smokeless Tobacco Never Used        Nursing note reviewed. Vitals:    05/23/22 1524   BP: 122/80   Site: Left Upper Arm   Position: Sitting   Cuff Size: Medium Adult   Pulse: 65   SpO2: 97%   Weight: 162 lb (73.5 kg)   Height: 5' 7\" (1.702 m)          BP Readings from Last 3 Encounters:   05/23/22 122/80   10/25/21 112/70   04/19/21 120/78     Wt Readings from Last 3 Encounters:   05/23/22 162 lb (73.5 kg)   10/25/21 163 lb (73.9 kg)   04/19/21 163 lb (73.9 kg)     Body mass index is 25.37 kg/m². No results found for this visit on 05/23/22. Physical Exam  Vitals and nursing note reviewed. Constitutional:       General: She is not in acute distress. Appearance: She is well-developed. She is not diaphoretic. HENT:      Head: Normocephalic. Eyes:      General:         Right eye: No discharge. Left eye: No discharge. Conjunctiva/sclera: Conjunctivae normal.      Pupils: Pupils are equal, round, and reactive to light. Cardiovascular:      Rate and Rhythm: Normal rate and regular rhythm. Heart sounds: Normal heart sounds. No murmur heard. Pulmonary:      Effort: Pulmonary effort is normal. No respiratory distress. Breath sounds: Normal breath sounds. No wheezing or rales. Musculoskeletal:      Cervical back: Normal range of motion. Skin:     General: Skin is warm and dry. Neurological:      Mental Status: She is alert and oriented to person, place, and time.    Psychiatric:         Behavior: Behavior normal. _________________________________________________  Assessment:     1. JONEL (generalized anxiety disorder)  -     ALPRAZolam (XANAX) 0.5 MG tablet; Take 1 tablet by mouth 2 times daily for 180 days. , Disp-180 tablet, R-1Normal  2. Acquired hypothyroidism  -     TSH; Future  3. Hair loss  -     TSH; Future  -     CBC; Future  4. Well adult exam  -     Comprehensive Metabolic Panel, Fasting; Future  -     Lipid, Fasting; Future  5. History of COVID-19  6. Anxiety  -     ALPRAZolam (XANAX) 0.5 MG tablet; Take 1 tablet by mouth 2 times daily for 180 days. , Disp-180 tablet, R-1Normal  7. Benzodiazepine dependence (HCC)  -     ALPRAZolam (XANAX) 0.5 MG tablet; Take 1 tablet by mouth 2 times daily for 180 days. , Disp-180 tablet, R-1Normal    NARX was reviewed and consistent. Urine drug screen last on 10/25/2021. Problems listed above are stable and therapeutic plan is unchanged unless otherwise specified. See orders above and comments below for details of workup or medication orders. _________________________________________________  Plan:     Return in about 6 months (around 11/23/2022), or if symptoms worsen or fail to improve, for recheck anxiety.       Electronically signed by Cody Katz MD on 5/23/22 at 3:26 PM EDT

## 2022-05-31 LAB
ALBUMIN/GLOBULIN RATIO: 2.6 RATIO (ref 0.8–2.6)
ALBUMIN: 4.7 G/DL (ref 3.5–5.2)
ALP BLD-CCNC: 75 U/L (ref 23–144)
ALT SERPL-CCNC: 19 U/L (ref 0–60)
AST SERPL-CCNC: 22 U/L (ref 0–55)
BILIRUB SERPL-MCNC: 0.7 MG/DL (ref 0–1.2)
BUN BLDV-MCNC: 18 MG/DL (ref 3–29)
BUN/CREAT BLD: 20 (ref 7–25)
CALCIUM SERPL-MCNC: 9.1 MG/DL (ref 8.5–10.5)
CHLORIDE BLD-SCNC: 106 MEQ/L (ref 96–110)
CHOLESTEROL: 184 MG/DL
CO2: 28 MEQ/L (ref 19–32)
CREAT SERPL-MCNC: 0.9 MG/DL (ref 0.5–1.2)
GLOBULIN: 1.8 G/DL (ref 1.9–3.6)
GLOMERULAR FILTRATION RATE: 74 MLS/MIN/1.73M2
GLUCOSE BLD-MCNC: 83 MG/DL (ref 70–99)
HCT VFR BLD CALC: 39.2 % (ref 34–49)
HDLC SERPL-MCNC: 55 MG/DL
HEMOGLOBIN: 13.3 G/DL (ref 11.2–15.7)
LDL CHOLESTEROL CALCULATED: 98 MG/DL
MCH RBC QN AUTO: 31.3 PG (ref 26–34)
MCHC RBC AUTO-ENTMCNC: 33.9 G/DL (ref 30.7–35.5)
MCV RBC AUTO: 92.2 FL (ref 80–100)
PDW BLD-RTO: 12.8 %
PLATELET # BLD: 303 K/UL (ref 140–400)
PMV BLD AUTO: 9.4 FL (ref 7.2–11.7)
POTASSIUM SERPL-SCNC: 5.1 MEQ/L (ref 3.4–5.3)
RBC # BLD: 4.25 M/UL (ref 3.95–5.26)
SODIUM BLD-SCNC: 144 MEQ/L (ref 135–148)
STATUS: ABNORMAL
TOTAL PROTEIN: 6.5 G/DL (ref 6–8.3)
TRIGL SERPL-MCNC: 157 MG/DL
TSH SERPL DL<=0.05 MIU/L-ACNC: 6.14 MCIU/ML (ref 0.4–4.5)
VLDLC SERPL CALC-MCNC: 31 MG/DL (ref 4–38)
WBC: 6.7 K/UL (ref 3.5–10.9)

## 2022-06-01 NOTE — RESULT ENCOUNTER NOTE
Recent labs are reviewed. TSH or thyroid functioning test shows that TSH is high meaning thyroid replacement dose is too low. Please see if she has missed any doses and if she has not she will need a dose increase, send this message back to me. Metabolic panel is normal except for slightly low globulin, otherwise kidney liver function sugar are all normal.  Cholesterol profile has mildly abnormal triglycerides, they have been much worse in the past.  These are actually good although a little bit worse than 2019.   Other portions of the cholesterol panel are normal.  Blood count is reported as normal.

## 2022-06-02 ENCOUNTER — TELEPHONE (OUTPATIENT)
Dept: FAMILY MEDICINE CLINIC | Age: 57
End: 2022-06-02

## 2022-06-02 NOTE — TELEPHONE ENCOUNTER
Called and spoke to patient and she needs medication to be sents to [facility], stated that she has not missed any medication. Pt called in and stated had blood work done last   week and pt stated TSH was to high and needed medication levothyroxine   (SYNTHROID) 50 MCG adjust .Pt stated instead of picking up the 50mg pt   want to know if provider can adjust it now and  the new adjust   amount . If someone can give pt a call. [address] Pt   is complete out .

## 2022-06-02 NOTE — TELEPHONE ENCOUNTER
----- Message from Janice Flores sent at 6/2/2022  9:58 AM EDT -----  Subject: Message to Provider    QUESTIONS  Information for Provider? Pt called in and stated had blood work done last   week and pt stated TSH was to high and needed medication levothyroxine   (SYNTHROID) 50 MCG adjust .Pt stated instead of picking up the 50mg pt   want to know if provider can adjust it now and  the new adjust   amount . If someone can give pt a call. Pharmacy -[address] [place] [address] Pt   is complete out .  ---------------------------------------------------------------------------  --------------  CALL BACK INFO  What is the best way for the office to contact you? OK to leave message on   voicemail  Preferred Call Back Phone Number? [phone]  ---------------------------------------------------------------------------  --------------  SCRIPT ANSWERS  Relationship to Patient?  Self

## 2022-06-03 ENCOUNTER — TELEPHONE (OUTPATIENT)
Dept: FAMILY MEDICINE CLINIC | Age: 57
End: 2022-06-03

## 2022-06-03 DIAGNOSIS — E03.9 HYPOTHYROIDISM, UNSPECIFIED TYPE: ICD-10-CM

## 2022-06-03 RX ORDER — LEVOTHYROXINE SODIUM 0.07 MG/1
75 TABLET ORAL DAILY
Qty: 30 TABLET | Refills: 5 | Status: SHIPPED | OUTPATIENT
Start: 2022-06-03 | End: 2022-06-07

## 2022-06-03 NOTE — TELEPHONE ENCOUNTER
Pt stated she had blood work done   last week and the results showed her TSH was to high. She has not missed   any doses of the medication, so she would like the dosage of this   medication to be adjusted before the refill is sent to the pharmacy  Patient is still awaiting the increase of the medication.  Please advise

## 2022-06-07 RX ORDER — LEVOTHYROXINE SODIUM 88 UG/1
88 TABLET ORAL DAILY
Qty: 30 TABLET | Refills: 5 | Status: SHIPPED | OUTPATIENT
Start: 2022-06-07

## 2022-06-09 ASSESSMENT — ENCOUNTER SYMPTOMS
COUGH: 0
WHEEZING: 0
ABDOMINAL PAIN: 0
BACK PAIN: 0
SHORTNESS OF BREATH: 0
CHEST TIGHTNESS: 0

## 2022-06-10 ENCOUNTER — NURSE TRIAGE (OUTPATIENT)
Dept: OTHER | Facility: CLINIC | Age: 57
End: 2022-06-10

## 2022-06-10 ENCOUNTER — TELEPHONE (OUTPATIENT)
Dept: FAMILY MEDICINE CLINIC | Age: 57
End: 2022-06-10

## 2022-06-10 NOTE — TELEPHONE ENCOUNTER
Patient c/o sore throat and swollen lymph nodes. Would like to set up asn appointment.  Advised to see [facility] or an Urgent Care as office schedule is full

## 2022-06-10 NOTE — TELEPHONE ENCOUNTER
Received call from  [facility] with Red Flag Complaint. Subjective: Caller states \"I have a sore throat and swollen lymph nodes in my neck, worse on the left side\"     Current Symptoms: sore throat, swollen lymph nodes    Onset: 1 day ago; worsening    Pain Severity: 3/10; sharp; waxing and waning    Temperature: denies     What has been tried: denies      Recommended disposition: See in Office Today    Care advice provided, patient verbalizes understanding; denies any other questions or concerns; instructed to call back for any new or worsening symptoms. Patient/Caller agrees with recommended disposition; writer provided warm transfer to [employer] at [facility] for appointment scheduling     Attention Provider: Thank you for allowing me to participate in the care of your patient. The patient was connected to triage in response to information provided to the [facility]. Please do not respond through this encounter as the response is not directed to a shared pool.       Reason for Disposition   Pus on tonsils (back of throat) and swollen neck lymph nodes ('glands')    Protocols used: SORE THROAT-ADULT-OH

## 2022-07-25 ENCOUNTER — TELEPHONE (OUTPATIENT)
Dept: FAMILY MEDICINE CLINIC | Age: 57
End: 2022-07-25

## 2022-07-25 NOTE — TELEPHONE ENCOUNTER
Medication sent as requested. Please warn her that this medication interacts with her alprazolam or Xanax medication. It will cause increased blood concentrations of Xanax and increased side effects. She may wish to hold her Xanax or take a half dose only while taking Paxlovid.

## 2022-07-25 NOTE — TELEPHONE ENCOUNTER
----- Message from [address] sent at 7/25/2022  3:05 PM EDT -----  Subject: Message to Provider    QUESTIONS  Information for Provider? Dr. Talbot Maria gave Aquilla Severe a prescription for   Paxlovid but needs to be [facility] on [address]  ---------------------------------------------------------------------------  --------------  Ru DE LA CRUZ  [phone]; OK to leave message on voicemail  ---------------------------------------------------------------------------  --------------  SCRIPT ANSWERS  Relationship to Patient?  Self

## 2022-07-25 NOTE — TELEPHONE ENCOUNTER
----- Message from Hermelinda Shows sent at 7/25/2022  9:30 AM EDT -----  Subject: Message to Provider    QUESTIONS  Information for Provider? pt test positive for Covid thru work today   [id number]--[id number] sore throat, cough, mild headache nasal congestion is asking   Dr. Mary Taylor call in a script for Paxlovid for her to [address] [facility], [address] [place] [address]   [phone] Luis Manuel Cage [phone] ---please call pt if needs appt  ---------------------------------------------------------------------------  --------------  [id number] CYBRA  [phone]; OK to leave message on voicemail  ---------------------------------------------------------------------------  --------------  SCRIPT ANSWERS  Relationship to Patient?  Self

## 2022-10-13 ENCOUNTER — OFFICE VISIT (OUTPATIENT)
Dept: FAMILY MEDICINE CLINIC | Age: 57
End: 2022-10-13
Payer: COMMERCIAL

## 2022-10-13 VITALS
OXYGEN SATURATION: 98 % | WEIGHT: 165 LBS | DIASTOLIC BLOOD PRESSURE: 68 MMHG | BODY MASS INDEX: 25.84 KG/M2 | HEART RATE: 70 BPM | SYSTOLIC BLOOD PRESSURE: 102 MMHG

## 2022-10-13 DIAGNOSIS — R20.0 NUMBNESS OF FINGERS: ICD-10-CM

## 2022-10-13 DIAGNOSIS — M77.12 LATERAL EPICONDYLITIS OF LEFT ELBOW: ICD-10-CM

## 2022-10-13 DIAGNOSIS — F41.9 ANXIETY: ICD-10-CM

## 2022-10-13 DIAGNOSIS — E03.9 ACQUIRED HYPOTHYROIDISM: ICD-10-CM

## 2022-10-13 DIAGNOSIS — F41.1 GAD (GENERALIZED ANXIETY DISORDER): ICD-10-CM

## 2022-10-13 DIAGNOSIS — Z12.31 ENCOUNTER FOR SCREENING MAMMOGRAM FOR MALIGNANT NEOPLASM OF BREAST: ICD-10-CM

## 2022-10-13 DIAGNOSIS — F13.20 BENZODIAZEPINE DEPENDENCE (HCC): ICD-10-CM

## 2022-10-13 DIAGNOSIS — Z23 NEED FOR INFLUENZA VACCINATION: ICD-10-CM

## 2022-10-13 DIAGNOSIS — M25.521 RIGHT ELBOW PAIN: Primary | ICD-10-CM

## 2022-10-13 LAB — TSH SERPL DL<=0.05 MIU/L-ACNC: 1.61 UIU/ML (ref 0.27–4.2)

## 2022-10-13 PROCEDURE — 99214 OFFICE O/P EST MOD 30 MIN: CPT | Performed by: FAMILY MEDICINE

## 2022-10-13 RX ORDER — ALPRAZOLAM 0.5 MG/1
0.5 TABLET ORAL 2 TIMES DAILY
Qty: 180 TABLET | Refills: 1 | Status: SHIPPED | OUTPATIENT
Start: 2022-11-15 | End: 2023-05-14

## 2022-10-13 NOTE — Clinical Note
Hi Rosette Resides  So I may have coded it incorrectly, because I did attach primary dx to the [id number] order. But I am ordering the procedure, or rather sending her to a physical medicine physician to have it performed. I have always been unclear (and the specialty care providers have different preferences) if I should order the procedure or the consult or the neur order. SO I DID NOT perform the procedure so that code may need reviewed since I actually sent her elsewhere to have that procedure.     Let me know what I should have done lol if it is different   Michael Bailey

## 2022-10-13 NOTE — PROGRESS NOTES
Chief Complaint   Patient presents with    Arm Pain     Pt reports pinched nerve in right arm and needs referral to neuro     Anxiety     Needing a refill of daily bid benzodiazepine, use has been appropriate and URINE DRUG SCREEN and agreement are due later this month       [place] NARRATIVE:    Right elbow pain for several weeks. Has used brace. Reports pain with reaching and numbness in last two fingers. No injury. Patient is established unless otherwise noted. Above chief complaint and [place] obtained by this physician provider. Review of Systems   Constitutional:  Negative for activity change, chills, fatigue and fever. HENT:  Negative for congestion. Respiratory:  Negative for cough, chest tightness, shortness of breath and wheezing. Cardiovascular:  Negative for chest pain and leg swelling. Gastrointestinal:  Negative for abdominal pain. Musculoskeletal:  Positive for arthralgias (right elbow pain and numbness in fingers). Negative for back pain, myalgias, neck pain and neck stiffness. Skin:  Negative for rash. Psychiatric/Behavioral:  Negative for behavioral problems and dysphoric mood. The patient is nervous/anxious. Allergies   Allergen Reactions    Tape Demetria An Tape] Other (See Comments)     Blisters skin    Tylenol [Acetaminophen]      Allergy historyupdated. Outpatient Medications Marked as Taking for the 10/13/22 encounter (Office Visit) with Lela Khalil MD   Medication Sig Dispense Refill    [START ON 11/15/2022] ALPRAZolam (XANAX) 0.5 MG tablet Take 1 tablet by mouth 2 times daily for 180 days.  180 tablet 1    levothyroxine (SYNTHROID) 88 MCG tablet Take 1 tablet by mouth daily 30 tablet 5    diflorasone (PSORCON) 0.05 % cream APPLY TO AFFECTED AREA 3 TIMES A DAY 60 g 2    MELATONIN PO Take 2.5 mg by mouth nightly as needed for Sleep      diclofenac sodium (VOLTAREN) 1 % GEL Apply 2 g topically 4 times daily 150 g 5    calcium carbonate (OSCAL) 500 MG TABS tablet Take 500 mg by mouth daily      vitamin C (ASCORBIC ACID) 500 MG tablet Take 500 mg by mouth daily      Biotin 1000 MCG CHEW Take by mouth      vitamin B-12 (CYANOCOBALAMIN) 100 MCG tablet Take 50 mcg by mouth daily      loratadine (CLARITIN) 10 MG capsule Take 10 mg by mouth daily      vitamin D (CHOLECALCIFEROL) 1000 UNIT TABS tablet Take 1,000 Units by mouth daily. therapeutic multivitamin-minerals (THERAGRAN-M) tablet Take 1 tablet by mouth daily. Tobacco use history updated. Social History     Tobacco Use   Smoking Status Never   Smokeless Tobacco Never        Nursing note reviewed. Vitals:    10/13/22 1449   BP: 102/68   Site: Left Upper Arm   Position: Sitting   Cuff Size: Medium Adult   Pulse: 70   SpO2: 98%   Weight: 165 lb (74.8 kg)          BP Readings from Last 3 Encounters:   10/13/22 102/68   05/23/22 122/80   10/25/21 112/70     Wt Readings from Last 3 Encounters:   10/13/22 165 lb (74.8 kg)   05/23/22 162 lb (73.5 kg)   10/25/21 163 lb (73.9 kg)     Body mass index is 25.84 kg/m². No results found for this visit on 10/13/22. Physical Exam  Vitals and nursing note reviewed. Constitutional:       General: She is not in acute distress. Appearance: She is well-developed. She is not diaphoretic. HENT:      Head: Normocephalic. Eyes:      General:         Right eye: No discharge. Left eye: No discharge. Conjunctiva/sclera: Conjunctivae normal.      Pupils: Pupils are equal, round, and reactive to light. Cardiovascular:      Rate and Rhythm: Normal rate and regular rhythm. Heart sounds: Normal heart sounds. No murmur heard. Pulmonary:      Effort: Pulmonary effort is normal. No respiratory distress. Breath sounds: Normal breath sounds. No wheezing or rales. Musculoskeletal:         General: Tenderness (to lateral epicondyle and pain with resistive testing of wrist extension and supination) present.       Cervical back: Normal range of motion. Skin:     General: Skin is warm and dry. Neurological:      Mental Status: She is alert and oriented to person, place, and time. Psychiatric:         Attention and Perception: Attention and perception normal.         Mood and Affect: Mood is anxious (Patient exhibits mildly anxious behavior). Behavior: Behavior normal.         Cognition and Memory: Cognition and memory normal.         _________________________________________________  Assessment:     1. Right elbow pain  -     TX NEEDLE EMG EA EXTREMTY W/PARASPINL AREA COMPLETE  -     Amb External Referral To Orthopedic Surgery  2. Encounter for screening mammogram for malignant neoplasm of breast  -     PATY DIGITAL SCREEN W CAD BILATERAL PER PROTOCOL; Future  3. Acquired hypothyroidism  -     TSH  4. Need for influenza vaccination  5. Numbness of fingers  -     [place] NEEDLE EMG EA EXTREMTY W/PARASPINL AREA COMPLETE  -     Amb External Referral To Orthopedic Surgery  6. Lateral epicondylitis of left elbow  -     [place] NEEDLE EMG EA EXTREMTY W/PARASPINL AREA COMPLETE  -     Amb External Referral To Orthopedic Surgery  7. JONEL (generalized anxiety disorder)  -     ALPRAZolam (XANAX) 0.5 MG tablet; Take 1 tablet by mouth 2 times daily for 180 days. , Disp-180 tablet, R-1Normal  8. Anxiety  -     ALPRAZolam (XANAX) 0.5 MG tablet; Take 1 tablet by mouth 2 times daily for 180 days. , Disp-180 tablet, R-1Normal  9. Benzodiazepine dependence (HCC)  -     ALPRAZolam (XANAX) 0.5 MG tablet; Take 1 tablet by mouth 2 times daily for 180 days. , Disp-180 tablet, R-1Normal    So she likely has a mix of nerve compromise possibly carpal tunnel or ulnar nerve issues, along with lateral epicondylitis. We will ask orthopedic surgery to see her as she believes symptoms are quite significant, they may address her chondritis with a brace or injection, we will also order EMG as there is a definite component of nerve compromise that may also need addressed.   We will need to localize the nerve lesion to decide best therapy going forward. Xanax refill was also provided today. Later this month she is due for annual CSM, we will delay this till her next visit in 4 to 6 months. Problems listed above are stable and therapeutic plan is unchanged unless otherwise specified. See orders above and comments below for details of workup or medication orders. _________________________________________________  Plan:     No follow-ups on file.       Electronically signed by Oksana Singleton MD on 10/13/22 at 2:59 PM EDT

## 2022-10-14 RX ORDER — ALPRAZOLAM 0.5 MG/1
TABLET ORAL
Qty: 180 TABLET | Refills: 1 | OUTPATIENT
Start: 2022-10-14

## 2022-10-14 ASSESSMENT — ENCOUNTER SYMPTOMS
BACK PAIN: 0
COUGH: 0
CHEST TIGHTNESS: 0
ABDOMINAL PAIN: 0
SHORTNESS OF BREATH: 0
WHEEZING: 0

## 2022-10-20 ENCOUNTER — OFFICE VISIT (OUTPATIENT)
Dept: PHYSICAL MEDICINE AND REHAB | Age: 57
End: 2022-10-20
Payer: COMMERCIAL

## 2022-10-20 VITALS — TEMPERATURE: 97.3 F

## 2022-10-20 DIAGNOSIS — M79.602 PARESTHESIA AND PAIN OF BOTH UPPER EXTREMITIES: Primary | ICD-10-CM

## 2022-10-20 DIAGNOSIS — M79.601 PARESTHESIA AND PAIN OF BOTH UPPER EXTREMITIES: Primary | ICD-10-CM

## 2022-10-20 DIAGNOSIS — R20.2 PARESTHESIA AND PAIN OF BOTH UPPER EXTREMITIES: Primary | ICD-10-CM

## 2022-10-20 PROCEDURE — 95911 NRV CNDJ TEST 9-10 STUDIES: CPT | Performed by: PHYSICAL MEDICINE & REHABILITATION

## 2022-10-20 PROCEDURE — 95886 MUSC TEST DONE W/N TEST COMP: CPT | Performed by: PHYSICAL MEDICINE & REHABILITATION

## 2022-10-27 NOTE — PROGRESS NOTES
EMG REPORT     CHIEF COMPLAINT: Shooting pains through her right arm. HISTORY OF PRESENT ILLNESS: 61 y.o. right hand dominant female with progressive shooting pains and occasional tingling in the right upper limb which often worsens with activity. The symptoms bother her sleep but have not caused her to drop things. She has intermittent neck stiffness. No clear cramping or limb discoloration. She rates the pain severity as 4/10. No significant lower limb symptoms of a similar nature. She has a history of a thyroid disorder. No diabetes. PHYSICAL EXAMINATION: Alert. About 70 degrees of active cervical spine rotation bilaterally. Spurling's maneuver was uncomfortable but did not reproduce limb symptoms. Upper limb reflexes were trace and symmetric. Tinel's sign was negative. There was some giveaway with manual muscle testing of right forearm pronation and elbow extension. No focal weakness distally. Sensation was preserved to confrontation. There was no atrophy, tremor or clonus noted. NERVE CONDUCTION STUDIES:     MOTOR         LATENCY NORMAL AMPLITUDE DISTANCE COND. REECE. RIGHT  MEDIAN 3.3 < 4.2 msec 9.5 8 cm 57   LEFT  MEDIAN 3.1 < 4.2 msec 6.3 8 cm >50   RIGHT  ULNAR 3.1 < 4.2 msec 5.8/6.6 8 cm 71/54   LEFT  ULNAR 2.3 < 4.2 msec 8.4 8 cm >50      SENSORY  ORTHODROMIC        LATENCY NORMAL AMPLITUDE DISTANCE   RIGHT MEDIAN 2.4 <2.3 msec 66 10 cm   LEFT  MEDIAN 2.3 < 2.3 msec 54 10 cm   RIGHT  ULNAR 2.4 < 2.3 msec 9 10 cm   LEFT  ULNAR 2.1 < 2.3 msec 15 10 cm       Right dorsal ulnar sensory: dL 2.5 msec, amp 12 microV.         NEEDLE EMG:      RIGHT   LEFT     Insertional Activity Spontaneous  Activity Volutional  MUAP's Insertional Activity Spontaneous  Activity Volutional  MUAP's   Cerv Parasp Normal None Normal      Infraspinatus Normal None Normal      Deltoid   Normal None Normal      Biceps   Normal None Normal      Triceps   Normal None Normal      Pronator Teres Normal None Normal      Extensor Indicis Normal None Normal      1st Dorsal Interosseus Normal None Normal      Abductor Pollicis Br. Normal None Normal          FINDINGS:   EMG of the cervical paraspinals and the right upper limb demonstrated no paraspinal nor limb muscle membrane irritability. Motor units were of normal configuration and recruitment throughout. Sensory and motor latencies, evoked amplitudes and nerve conduction velocities were well within normal limits. IMPRESSION:      1. Normal electrodiagnostic study of the areas described above. 2.  No evidence of a focal cervical spinal nerve root injury (radiculopathy), plexopathy, focal peripheral nerve entrapment syndrome, generalized neuropathy or primary muscle disease.        Thank you for this interesting referral.

## 2022-10-28 ENCOUNTER — TELEPHONE (OUTPATIENT)
Dept: FAMILY MEDICINE CLINIC | Age: 57
End: 2022-10-28

## 2022-11-08 RX ORDER — LEVOTHYROXINE SODIUM 88 UG/1
TABLET ORAL
Qty: 30 TABLET | Refills: 5 | Status: SHIPPED | OUTPATIENT
Start: 2022-11-08

## 2022-11-09 ENCOUNTER — COMMUNITY OUTREACH (OUTPATIENT)
Dept: FAMILY MEDICINE CLINIC | Age: 57
End: 2022-11-09

## 2022-11-09 NOTE — PROGRESS NOTES
Patient's HM shows they are overdue for [place] [id number] and  files searched. No results to attach to order nor HM updated.

## 2023-04-18 ENCOUNTER — OFFICE VISIT (OUTPATIENT)
Dept: FAMILY MEDICINE CLINIC | Age: 58
End: 2023-04-18
Payer: COMMERCIAL

## 2023-04-18 VITALS
DIASTOLIC BLOOD PRESSURE: 82 MMHG | OXYGEN SATURATION: 99 % | HEART RATE: 63 BPM | SYSTOLIC BLOOD PRESSURE: 118 MMHG | WEIGHT: 165 LBS | BODY MASS INDEX: 25.84 KG/M2

## 2023-04-18 DIAGNOSIS — Z23 NEED FOR TDAP VACCINATION: ICD-10-CM

## 2023-04-18 DIAGNOSIS — F13.20 BENZODIAZEPINE DEPENDENCE (HCC): ICD-10-CM

## 2023-04-18 DIAGNOSIS — E03.9 ACQUIRED HYPOTHYROIDISM: ICD-10-CM

## 2023-04-18 DIAGNOSIS — Z23 NEED FOR CHICKENPOX VACCINATION: ICD-10-CM

## 2023-04-18 DIAGNOSIS — F41.1 GAD (GENERALIZED ANXIETY DISORDER): Primary | ICD-10-CM

## 2023-04-18 PROCEDURE — 99214 OFFICE O/P EST MOD 30 MIN: CPT | Performed by: FAMILY MEDICINE

## 2023-04-18 RX ORDER — LEVOTHYROXINE SODIUM 88 UG/1
88 TABLET ORAL DAILY
Qty: 90 TABLET | Refills: 1 | Status: SHIPPED | OUTPATIENT
Start: 2023-04-18

## 2023-04-18 RX ORDER — ZOSTER VACCINE RECOMBINANT, ADJUVANTED 50 MCG/0.5
0.5 KIT INTRAMUSCULAR SEE ADMIN INSTRUCTIONS
Qty: 0.5 ML | Refills: 0 | Status: SHIPPED | OUTPATIENT
Start: 2023-04-18 | End: 2023-10-15

## 2023-04-18 ASSESSMENT — ENCOUNTER SYMPTOMS
SHORTNESS OF BREATH: 0
ABDOMINAL PAIN: 0
CHEST TIGHTNESS: 0
BACK PAIN: 0
COUGH: 0
WHEEZING: 0

## 2023-04-18 NOTE — PROGRESS NOTES
Chief Complaint   Patient presents with    6 Month Follow-Up     Multiple issues    Medication Refill     UDS and med contract     Other     Pt would like to discuss weight loss options but BMI is just over 25    Anxiety     On benzo       [place] NARRATIVE:    Hypothyroid and allergies.  had had adipex and now on qsymia. She was hoping she had some medication options but her weight is just over 25. She would still like to lose so we talked about healthy eating and exercise and sticking to a careful diet. She is on chronic benzo so is due for medication contract and urine drug screen. However she appeared to be late for work as she kept checking the time and she left before we could send her to the bathroom for urine drug screen. We will call her back. Elbow surgery 1/19/2023 right elbow with great improvement of symptoms. She is feeling well and back to full-time work at [employer], [place], operated by [employer] [facility]. Patient is established unless otherwise noted. Above chief complaint and [place] obtained by this physician provider. Review of Systems   Constitutional:  Negative for activity change, chills, fatigue and fever. HENT:  Negative for congestion. Respiratory:  Negative for cough, chest tightness, shortness of breath and wheezing. Cardiovascular:  Negative for chest pain and leg swelling. Gastrointestinal:  Negative for abdominal pain. Musculoskeletal:  Negative for back pain and myalgias. Skin:  Negative for rash. Psychiatric/Behavioral:  Negative for behavioral problems and dysphoric mood. The patient is nervous/anxious (stress with mom's alzheimers). Allergies   Allergen Reactions    Tape Thornell Andre Tape] Other (See Comments)     Blisters skin    Tylenol [Acetaminophen]      Allergy historyupdated.     Outpatient Medications Marked as Taking for the 4/18/23 encounter (Office Visit) with Zac Muller MD   Medication Sig Dispense Refill    zoster recombinant adjuvanted vaccine

## 2023-05-08 DIAGNOSIS — F41.1 GAD (GENERALIZED ANXIETY DISORDER): ICD-10-CM

## 2023-05-08 DIAGNOSIS — F41.9 ANXIETY: ICD-10-CM

## 2023-05-08 DIAGNOSIS — F13.20 BENZODIAZEPINE DEPENDENCE (HCC): ICD-10-CM

## 2023-05-08 RX ORDER — ALPRAZOLAM 0.5 MG/1
0.5 TABLET ORAL 2 TIMES DAILY
Qty: 180 TABLET | Refills: 0 | Status: SHIPPED | OUTPATIENT
Start: 2023-05-08 | End: 2023-08-06

## 2023-05-30 DIAGNOSIS — F41.1 GAD (GENERALIZED ANXIETY DISORDER): ICD-10-CM

## 2023-05-30 DIAGNOSIS — F41.9 ANXIETY: ICD-10-CM

## 2023-05-30 DIAGNOSIS — F13.20 BENZODIAZEPINE DEPENDENCE (HCC): ICD-10-CM

## 2023-05-30 NOTE — TELEPHONE ENCOUNTER
Pt is leaving for vacation on 6/3/23 and her xanax refill is due on 6/6/23, pharmacy is requesting approval for early refill on 6/2/23 so pt can  before she leaves.  Please advise

## 2023-06-01 RX ORDER — ALPRAZOLAM 0.5 MG/1
0.5 TABLET ORAL 2 TIMES DAILY
Qty: 60 TABLET | Refills: 0 | Status: SHIPPED | OUTPATIENT
Start: 2023-06-01 | End: 2023-08-30

## 2023-06-01 NOTE — TELEPHONE ENCOUNTER
Controlled substance Rx signed for a month until Dr. Arroyo Courser is back in office for more long term Rx

## 2023-07-31 ENCOUNTER — NURSE ONLY (OUTPATIENT)
Dept: FAMILY MEDICINE CLINIC | Age: 58
End: 2023-07-31
Payer: COMMERCIAL

## 2023-07-31 DIAGNOSIS — F13.20 BENZODIAZEPINE DEPENDENCE (HCC): Primary | ICD-10-CM

## 2023-07-31 DIAGNOSIS — F41.9 ANXIETY: ICD-10-CM

## 2023-07-31 DIAGNOSIS — F41.1 GAD (GENERALIZED ANXIETY DISORDER): ICD-10-CM

## 2023-07-31 PROCEDURE — 99211 OFF/OP EST MAY X REQ PHY/QHP: CPT | Performed by: FAMILY MEDICINE

## 2023-07-31 RX ORDER — ALPRAZOLAM 0.5 MG/1
0.5 TABLET ORAL 2 TIMES DAILY
Qty: 14 TABLET | Refills: 0 | Status: SHIPPED | OUTPATIENT
Start: 2023-07-31 | End: 2023-08-07

## 2023-07-31 NOTE — PROGRESS NOTES
Patient signed treatment agreement and provided URINE DRUG SCREEN specimen today, per previous information we will give 7 day rx while awaiting labs. This was sent.

## 2023-08-06 LAB
6MAM UR QL: NOT DETECTED
7AMINOCLONAZEPAM UR QL: NOT DETECTED
A-OH ALPRAZ UR QL: PRESENT
ALPHA-OH-MIDAZOLAM, URINE: NOT DETECTED
ALPRAZ UR QL: NOT DETECTED
AMPHET UR QL SCN: NOT DETECTED
ANNOTATION COMMENT IMP: NORMAL
ANNOTATION COMMENT IMP: NORMAL
BARBITURATES UR QL: NOT DETECTED
BUPRENORPHINE UR QL: NOT DETECTED
BZE UR QL: NOT DETECTED
CARBOXYTHC UR QL: NOT DETECTED
CARISOPRODOL UR QL: NOT DETECTED
CLONAZEPAM UR QL: NOT DETECTED
CODEINE UR QL: NOT DETECTED
CREAT UR-MCNC: 82.1 MG/DL (ref 20–400)
DIAZEPAM UR QL: NOT DETECTED
ETHYL GLUCURONIDE UR QL: PRESENT
FENTANYL UR QL: NOT DETECTED
GABAPENTIN: NOT DETECTED
HYDROCODONE UR QL: NOT DETECTED
HYDROMORPHONE UR QL: NOT DETECTED
LORAZEPAM UR QL: NOT DETECTED
MDA UR QL: NOT DETECTED
MDEA UR QL: NOT DETECTED
MDMA UR QL: NOT DETECTED
MEPERIDINE UR QL: NOT DETECTED
METHADONE UR QL: NOT DETECTED
METHAMPHET UR QL: NOT DETECTED
MIDAZOLAM UR QL SCN: NOT DETECTED
MORPHINE UR QL: NOT DETECTED
NALOXONE: NOT DETECTED
NORBUPRENORPHINE UR QL CFM: NOT DETECTED
NORDIAZEPAM UR QL: NOT DETECTED
NORFENTANYL UR QL: NOT DETECTED
NORHYDROCODONE UR QL CFM: NOT DETECTED
NOROXYCODONE UR QL CFM: NOT DETECTED
NOROXYMORPHONE, URINE: NOT DETECTED
OXAZEPAM UR QL: NOT DETECTED
OXYCODONE UR QL: NOT DETECTED
OXYMORPHONE UR QL: NOT DETECTED
PATHOLOGY STUDY: NORMAL
PCP UR QL: NOT DETECTED
PHENTERMINE UR QL: NOT DETECTED
PPAA UR QL: NOT DETECTED
PREGABALIN: NOT DETECTED
SERVICE CMNT-IMP: NORMAL
TAPENTADOL UR QL SCN: NOT DETECTED
TAPENTADOL-O-SULFATE, URINE: NOT DETECTED
TEMAZEPAM UR QL: NOT DETECTED
TRAMADOL UR QL: NOT DETECTED
ZOLPIDEM UR QL: NOT DETECTED

## 2023-08-14 DIAGNOSIS — F41.9 ANXIETY: ICD-10-CM

## 2023-08-14 DIAGNOSIS — F13.20 BENZODIAZEPINE DEPENDENCE (HCC): ICD-10-CM

## 2023-08-14 DIAGNOSIS — F41.1 GAD (GENERALIZED ANXIETY DISORDER): ICD-10-CM

## 2023-08-14 RX ORDER — ALPRAZOLAM 0.5 MG/1
0.5 TABLET ORAL 2 TIMES DAILY
Qty: 14 TABLET | Refills: 0 | Status: CANCELLED | OUTPATIENT
Start: 2023-08-14 | End: 2023-08-21

## 2023-08-15 RX ORDER — ALPRAZOLAM 0.5 MG/1
0.5 TABLET ORAL 2 TIMES DAILY
Qty: 180 TABLET | Refills: 0 | Status: SHIPPED | OUTPATIENT
Start: 2023-08-15 | End: 2023-11-13

## 2023-09-02 DIAGNOSIS — E03.9 ACQUIRED HYPOTHYROIDISM: ICD-10-CM

## 2023-09-05 RX ORDER — LEVOTHYROXINE SODIUM 88 UG/1
TABLET ORAL
Qty: 90 TABLET | Refills: 1 | Status: SHIPPED | OUTPATIENT
Start: 2023-09-05

## 2023-09-21 NOTE — RESULT ENCOUNTER NOTE
From: Chantell TATE  To: Gemini Deng  Sent: 9/15/2023 8:11 AM CDT  Subject: Lab results    Gemini vaughn is Courtney. I saw were you reviewed your labs in your My Chart. Did you respond to Dina question about taking the Vit D and seeing GYN?    This TSH result is very good and normal, no change recommended to medications I think we found the right dose. Not routed for staff call but released to [facility] only.

## 2023-10-30 ENCOUNTER — OFFICE VISIT (OUTPATIENT)
Dept: FAMILY MEDICINE CLINIC | Age: 58
End: 2023-10-30
Payer: COMMERCIAL

## 2023-10-30 VITALS
OXYGEN SATURATION: 98 % | HEIGHT: 67 IN | SYSTOLIC BLOOD PRESSURE: 118 MMHG | BODY MASS INDEX: 25.11 KG/M2 | DIASTOLIC BLOOD PRESSURE: 78 MMHG | HEART RATE: 66 BPM | WEIGHT: 160 LBS

## 2023-10-30 DIAGNOSIS — E03.9 ACQUIRED HYPOTHYROIDISM: ICD-10-CM

## 2023-10-30 DIAGNOSIS — Z23 NEED FOR CHICKENPOX VACCINATION: ICD-10-CM

## 2023-10-30 DIAGNOSIS — M54.2 CERVICALGIA: ICD-10-CM

## 2023-10-30 DIAGNOSIS — F41.9 ANXIETY: ICD-10-CM

## 2023-10-30 DIAGNOSIS — Z23 NEED FOR TDAP VACCINATION: ICD-10-CM

## 2023-10-30 DIAGNOSIS — Z00.01 ENCOUNTER FOR WELL ADULT EXAM WITH ABNORMAL FINDINGS: Primary | ICD-10-CM

## 2023-10-30 DIAGNOSIS — F13.20 BENZODIAZEPINE DEPENDENCE (HCC): ICD-10-CM

## 2023-10-30 DIAGNOSIS — R59.0 CERVICAL LYMPHADENOPATHY: ICD-10-CM

## 2023-10-30 LAB
ALBUMIN SERPL-MCNC: 4.8 G/DL (ref 3.4–5)
ALBUMIN/GLOB SERPL: 2.3 {RATIO} (ref 1.1–2.2)
ALP SERPL-CCNC: 79 U/L (ref 40–129)
ALT SERPL-CCNC: 17 U/L (ref 10–40)
ANION GAP SERPL CALCULATED.3IONS-SCNC: 12 MMOL/L (ref 3–16)
AST SERPL-CCNC: 21 U/L (ref 15–37)
BASOPHILS # BLD: 0 K/UL (ref 0–0.2)
BASOPHILS NFR BLD: 0.9 %
BILIRUB SERPL-MCNC: 0.6 MG/DL (ref 0–1)
BUN SERPL-MCNC: 21 MG/DL (ref 7–20)
CALCIUM SERPL-MCNC: 9.3 MG/DL (ref 8.3–10.6)
CHLORIDE SERPL-SCNC: 105 MMOL/L (ref 99–110)
CHOLEST SERPL-MCNC: 184 MG/DL (ref 0–199)
CO2 SERPL-SCNC: 26 MMOL/L (ref 21–32)
CREAT SERPL-MCNC: 0.9 MG/DL (ref 0.6–1.1)
DEPRECATED RDW RBC AUTO: 12.8 % (ref 12.4–15.4)
EOSINOPHIL # BLD: 0.2 K/UL (ref 0–0.6)
EOSINOPHIL NFR BLD: 4.6 %
GFR SERPLBLD CREATININE-BSD FMLA CKD-EPI: >60 ML/MIN/{1.73_M2}
GLUCOSE P FAST SERPL-MCNC: 91 MG/DL (ref 70–99)
HCT VFR BLD AUTO: 40.6 % (ref 36–48)
HDLC SERPL-MCNC: 64 MG/DL (ref 40–60)
HGB BLD-MCNC: 14 G/DL (ref 12–16)
LDL CHOLESTEROL CALCULATED: 105 MG/DL
LYMPHOCYTES # BLD: 1.9 K/UL (ref 1–5.1)
LYMPHOCYTES NFR BLD: 34.3 %
MCH RBC QN AUTO: 32.9 PG (ref 26–34)
MCHC RBC AUTO-ENTMCNC: 34.5 G/DL (ref 31–36)
MCV RBC AUTO: 95.4 FL (ref 80–100)
MONOCYTES # BLD: 0.5 K/UL (ref 0–1.3)
MONOCYTES NFR BLD: 8.6 %
NEUTROPHILS # BLD: 2.8 K/UL (ref 1.7–7.7)
NEUTROPHILS NFR BLD: 51.6 %
PLATELET # BLD AUTO: 308 K/UL (ref 135–450)
PMV BLD AUTO: 8.3 FL (ref 5–10.5)
POTASSIUM SERPL-SCNC: 4.6 MMOL/L (ref 3.5–5.1)
PROT SERPL-MCNC: 6.9 G/DL (ref 6.4–8.2)
RBC # BLD AUTO: 4.26 M/UL (ref 4–5.2)
SODIUM SERPL-SCNC: 143 MMOL/L (ref 136–145)
TRIGL SERPL-MCNC: 77 MG/DL (ref 0–150)
TSH SERPL DL<=0.005 MIU/L-ACNC: 1.05 UIU/ML (ref 0.27–4.2)
VLDLC SERPL CALC-MCNC: 15 MG/DL
WBC # BLD AUTO: 5.5 K/UL (ref 4–11)

## 2023-10-30 PROCEDURE — 36415 COLL VENOUS BLD VENIPUNCTURE: CPT | Performed by: FAMILY MEDICINE

## 2023-10-30 PROCEDURE — 99396 PREV VISIT EST AGE 40-64: CPT | Performed by: FAMILY MEDICINE

## 2023-10-30 RX ORDER — ZOSTER VACCINE RECOMBINANT, ADJUVANTED 50 MCG/0.5
0.5 KIT INTRAMUSCULAR SEE ADMIN INSTRUCTIONS
Qty: 0.5 ML | Refills: 0 | Status: SHIPPED | OUTPATIENT
Start: 2023-10-30 | End: 2024-04-27

## 2023-10-30 RX ORDER — CYCLOBENZAPRINE HCL 10 MG
10 TABLET ORAL NIGHTLY PRN
Qty: 15 TABLET | Refills: 1 | Status: SHIPPED | OUTPATIENT
Start: 2023-10-30 | End: 2023-11-29

## 2023-10-30 SDOH — ECONOMIC STABILITY: INCOME INSECURITY: HOW HARD IS IT FOR YOU TO PAY FOR THE VERY BASICS LIKE FOOD, HOUSING, MEDICAL CARE, AND HEATING?: PATIENT DECLINED

## 2023-10-30 SDOH — ECONOMIC STABILITY: FOOD INSECURITY: WITHIN THE PAST 12 MONTHS, THE FOOD YOU BOUGHT JUST DIDN'T LAST AND YOU DIDN'T HAVE MONEY TO GET MORE.: PATIENT DECLINED

## 2023-10-30 SDOH — ECONOMIC STABILITY: FOOD INSECURITY: WITHIN THE PAST 12 MONTHS, YOU WORRIED THAT YOUR FOOD WOULD RUN OUT BEFORE YOU GOT MONEY TO BUY MORE.: PATIENT DECLINED

## 2023-10-30 SDOH — ECONOMIC STABILITY: HOUSING INSECURITY
IN THE LAST 12 MONTHS, WAS THERE A TIME WHEN YOU DID NOT HAVE A STEADY PLACE TO SLEEP OR SLEPT IN A SHELTER (INCLUDING NOW)?: PATIENT REFUSED

## 2023-10-30 ASSESSMENT — ENCOUNTER SYMPTOMS
CHEST TIGHTNESS: 0
ABDOMINAL PAIN: 0
BACK PAIN: 0
COUGH: 0
WHEEZING: 0
SHORTNESS OF BREATH: 0

## 2023-10-30 ASSESSMENT — PATIENT HEALTH QUESTIONNAIRE - PHQ9
1. LITTLE INTEREST OR PLEASURE IN DOING THINGS: 0
2. FEELING DOWN, DEPRESSED OR HOPELESS: 0
SUM OF ALL RESPONSES TO PHQ QUESTIONS 1-9: 0
SUM OF ALL RESPONSES TO PHQ9 QUESTIONS 1 & 2: 0

## 2023-10-30 NOTE — PROGRESS NOTES
Chief Complaint   Patient presents with    6 Month Follow-Up     Multiple issues    Neck Pain     Pt reports pain and swelling in the lymph nodes x 1 week. Pt reports no other sx     Hypothyroidism     on med for hypothyroidism    Anxiety     Currently on low dose benzos       [place] NARRATIVE:    Swollen lymph node left side of neck less than a week. Some neck pain for the same length of time, but no illness sx or constitutional sx. No old neck pain hx, no scalp rash or haircolor or earring trouble. Patient is established unless otherwise noted. Above chief complaint and [place] obtained by this physician provider. Review of Systems   Constitutional:  Negative for activity change, chills, fatigue and fever. HENT:  Negative for congestion. Respiratory:  Negative for cough, chest tightness, shortness of breath and wheezing. Cardiovascular:  Negative for chest pain and leg swelling. Gastrointestinal:  Negative for abdominal pain. Musculoskeletal:  Positive for neck pain. Negative for back pain and myalgias. Skin:  Negative for rash. Psychiatric/Behavioral:  Negative for behavioral problems and dysphoric mood. Allergies   Allergen Reactions    Tape Gaby Fujisawa Tape] Other (See Comments)     Blisters skin    Tylenol [Acetaminophen]     Dextromethorphan Hbr Nausea And Vomiting     Allergy historyupdated.     Outpatient Medications Marked as Taking for the 10/30/23 encounter (Office Visit) with Henrik Sol MD   Medication Sig Dispense Refill    Tetanus-Diphth-Acell Pertussis (BOOSTRIX) 5-2.5-18.5 LF-MCG/0.5 injection Inject 0.5 mLs into the muscle once for 1 dose 1 each 0    zoster recombinant adjuvanted vaccine [facility]) 50 MCG/0.5ML SUSR injection Inject 0.5 mLs into the muscle See Admin Instructions 1 dose now and repeat in 2-6 months 0.5 mL 0    cyclobenzaprine (FLEXERIL) 10 MG tablet Take 1 tablet by mouth nightly as needed for Muscle spasms 15 tablet 1    levothyroxine (SYNTHROID) 88 MCG

## 2023-10-31 NOTE — RESULT ENCOUNTER NOTE
Recent labs are reviewed and all numbers are looking pretty good. Metabolic panel is fine with minor levels just out of range. Cholesterol profile is looking ok. TSH is also fine. Recheck next spring as planned    Not routed for staff call but released to my chart only.

## 2023-11-25 DIAGNOSIS — F13.20 BENZODIAZEPINE DEPENDENCE (HCC): ICD-10-CM

## 2023-11-25 DIAGNOSIS — F41.1 GAD (GENERALIZED ANXIETY DISORDER): ICD-10-CM

## 2023-11-25 DIAGNOSIS — F41.9 ANXIETY: ICD-10-CM

## 2023-11-27 RX ORDER — ALPRAZOLAM 0.5 MG/1
TABLET ORAL
Qty: 180 TABLET | Refills: 0 | Status: SHIPPED | OUTPATIENT
Start: 2023-11-27 | End: 2024-05-25

## 2023-11-27 NOTE — TELEPHONE ENCOUNTER
150-199 MG/DL  HIGH: 200-499 MG/DL  VERY HIGH: > OR = 500 MG/DL    \"Nonfasting specimens may have modest increases in Triglyceride   levels\" ([employer], 2019)       HDL   Date Value Ref Range Status   10/30/2023 64 (H) 40 - 60 mg/dL Final     LDL Calculated   Date Value Ref Range Status   10/30/2023 105 (H) <100 mg/dL Final     VLDL Cholesterol Calculated   Date Value Ref Range Status   10/30/2023 15 Not Established mg/dL Final     VLDL   Date Value Ref Range Status   05/31/2022 31 4 - 38 MG/DL Final       A1C:    No results found for: \"LABA1C\", \"[id number]\"    TSH:    TSH   Date Value Ref Range Status   10/30/2023 1.05 0.27 - 4.20 uIU/mL Final   04/25/2018 2.73 0.27 - 4.20 uIU/mL Final     TSH Reflex FT4   Date Value Ref Range Status   04/18/2019 2.84 0.27 - 4.20 uIU/mL Final     TSH, High Sensitivity   Date Value Ref Range Status   04/19/2021 2.850 0.270 - 4.20 uIu/ml Final     Comment:             Patients with high levels of Biotin intake (ie >5mg/day) may have falsely decreased TSHS   levels. Samples collected within 8 hours of Biotin intake may require additional information for   diagnosis. UA:  Color, UA   Date Value Ref Range Status   11/27/2012 LT. YELLOW (A) UYELL Final     Clarity, UA   Date Value Ref Range Status   11/27/2012 CLEAR CLEAR Final     Glucose, Urine   Date Value Ref Range Status   11/27/2012 NEGATIVE NEG MG/DL Final     Bilirubin Urine   Date Value Ref Range Status   11/27/2012 NEGATIVE NEG MG/DL Final     Ketones, Urine   Date Value Ref Range Status   11/27/2012 NEGATIVE NEG MG/DL Final     Specific Gravity, UA   Date Value Ref Range Status   11/27/2012 1.009 1.001 - 1.035 Final     Blood, Urine   Date Value Ref Range Status   11/27/2012 NEGATIVE NEG Final     pH, Urine   Date Value Ref Range Status   11/27/2012 7.5 5.0 - 8.0 Final     Protein, UA   Date Value Ref Range Status   11/27/2012 NEGATIVE NEG MG/DL Final     Urobilinogen, Urine   Date Value Ref Range Status

## 2024-02-06 NOTE — PROGRESS NOTES
SUBJECTIVE:  Ronald Wang   1965   female   Allergies   Allergen Reactions    Tape Lorella Joel Tape] Other (See Comments)     Blisters skin    Tylenol [Acetaminophen]        Chief Complaint   Patient presents with    6 Month Follow-Up      HPI   Here for 6 month recheck appointment. She reports feeling more tired than usual recently. Her current dose of zoloft is helpful for her anxiety as well as the prn xanax. She is fasting today to have her lab work done. 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 Past Surgical History:   Procedure Laterality Date    BREAST SURGERY Left     MASS REMOVAL     HYSTERECTOMY      Right ovary and fall. tube removed.  LITHOTRIPSY      NECK SURGERY      swollen gland removed from right neck       Review of Systems   Constitutional: Positive for fatigue. Negative for unexpected weight change. Respiratory: Negative for shortness of breath. Cardiovascular: Negative for chest pain. Gastrointestinal: Negative for abdominal distention, nausea and vomiting. Neurological: Negative for dizziness, weakness and headaches. Psychiatric/Behavioral: Negative for agitation and sleep disturbance. The patient is not nervous/anxious.         OBJECTIVE:  /68 (Site: Left Arm, Position: Sitting, Cuff Size: Medium Adult)   Pulse 66   Resp 17   Ht 5' 7\" (1.702 m)   Wt 173 lb 3.2 oz (78.6 kg)   LMP 08/01/2012   SpO2 98%   BMI 27.13 kg/m²   BP Readings from Last 3 Encounters:   10/25/17 112/68   06/12/17 110/72   04/26/17 100/62     Wt Readings from Last 3 Encounters:   10/25/17 173 lb 3.2 oz (78.6 kg)   06/12/17 166 lb 6.4 oz (75.5 kg)   04/26/17 163 lb 3.2 oz (74 kg)     Body mass index is 27.13 kg/m². Physical Exam   Constitutional: She is oriented to person, place, and time. She appears well-developed and well-nourished. No distress. HENT:   Head: Normocephalic and atraumatic. Nose: Nose normal.   Mouth/Throat: Oropharynx is clear and moist.   Eyes: Conjunctivae are normal. Pupils are equal, round, and reactive to light. Neck: Normal range of motion. Neck supple. Cardiovascular: Normal rate, regular rhythm and normal heart sounds. Pulmonary/Chest: Effort normal and breath sounds normal.   Abdominal: Soft. Bowel sounds are normal.   Musculoskeletal: Normal range of motion. Neurological: She is alert and oriented to person, place, and time. Skin: Skin is warm and dry. Psychiatric: She has a normal mood and affect. Her behavior is normal. Judgment and thought content normal.   Nursing note and vitals reviewed. ASSESSMENT/PLAN:    1. Hypothyroidism, unspecified type  - levothyroxine (SYNTHROID) 50 MCG tablet; Take 1 tablet by mouth daily  Dispense: 90 tablet; Refill: 1  - TSH with Reflex; Future    2. Anxiety  Routine exercise as tolerated  Healthy diet, limit caffeine  - ALPRAZolam (XANAX) 0.5 MG tablet; Take 1 tablet by mouth 2 times daily as needed for Anxiety  Dispense: 180 tablet; Refill: 1    3. Need for hepatitis C screening test  - Hepatitis C Antibody    4. Screening for HIV without presence of risk factors  - HIV Screen    5. Lethargy  - VITAMIN D 25 HYDROXY    6. Screening for lipid disorders  - Lipid Panel    7. Screening for diabetes mellitus  - Comprehensive Metabolic Panel    8.  Screening for deficiency anemia  - CBC Auto Differential    Orders Speaking Coherently

## 2024-03-04 ENCOUNTER — OFFICE VISIT (OUTPATIENT)
Dept: FAMILY MEDICINE CLINIC | Age: 59
End: 2024-03-04

## 2024-03-04 VITALS
BODY MASS INDEX: 25.37 KG/M2 | OXYGEN SATURATION: 100 % | HEART RATE: 69 BPM | WEIGHT: 162 LBS | TEMPERATURE: 97.9 F | DIASTOLIC BLOOD PRESSURE: 82 MMHG | SYSTOLIC BLOOD PRESSURE: 118 MMHG

## 2024-03-04 DIAGNOSIS — F13.20 BENZODIAZEPINE DEPENDENCE (HCC): ICD-10-CM

## 2024-03-04 DIAGNOSIS — R59.0 LYMPHADENOPATHY OF LEFT CERVICAL REGION: Primary | ICD-10-CM

## 2024-03-04 DIAGNOSIS — I88.9 LYMPHADENITIS: ICD-10-CM

## 2024-03-04 DIAGNOSIS — M54.2 NECK PAIN: ICD-10-CM

## 2024-03-04 LAB
BASOPHILS # BLD: 0 K/UL (ref 0–0.2)
BASOPHILS NFR BLD: 0.6 %
DEPRECATED RDW RBC AUTO: 12.9 % (ref 12.4–15.4)
EOSINOPHIL # BLD: 0.1 K/UL (ref 0–0.6)
EOSINOPHIL NFR BLD: 2.1 %
HCT VFR BLD AUTO: 39.7 % (ref 36–48)
HGB BLD-MCNC: 13.6 G/DL (ref 12–16)
LYMPHOCYTES # BLD: 2.5 K/UL (ref 1–5.1)
LYMPHOCYTES NFR BLD: 40.9 %
MCH RBC QN AUTO: 32.5 PG (ref 26–34)
MCHC RBC AUTO-ENTMCNC: 34.3 G/DL (ref 31–36)
MCV RBC AUTO: 94.6 FL (ref 80–100)
MONOCYTES # BLD: 0.5 K/UL (ref 0–1.3)
MONOCYTES NFR BLD: 7.9 %
NEUTROPHILS # BLD: 3 K/UL (ref 1.7–7.7)
NEUTROPHILS NFR BLD: 48.5 %
PLATELET # BLD AUTO: 292 K/UL (ref 135–450)
PMV BLD AUTO: 7.8 FL (ref 5–10.5)
RBC # BLD AUTO: 4.2 M/UL (ref 4–5.2)
WBC # BLD AUTO: 6.1 K/UL (ref 4–11)

## 2024-03-04 RX ORDER — CEFUROXIME AXETIL 250 MG/1
250 TABLET ORAL 2 TIMES DAILY
Qty: 20 TABLET | Refills: 0 | Status: SHIPPED | OUTPATIENT
Start: 2024-03-04 | End: 2024-03-14

## 2024-03-04 ASSESSMENT — PATIENT HEALTH QUESTIONNAIRE - PHQ9
SUM OF ALL RESPONSES TO PHQ QUESTIONS 1-9: 0
SUM OF ALL RESPONSES TO PHQ QUESTIONS 1-9: 0
1. LITTLE INTEREST OR PLEASURE IN DOING THINGS: 0
2. FEELING DOWN, DEPRESSED OR HOPELESS: 0
SUM OF ALL RESPONSES TO PHQ QUESTIONS 1-9: 0
SUM OF ALL RESPONSES TO PHQ QUESTIONS 1-9: 0
SUM OF ALL RESPONSES TO PHQ9 QUESTIONS 1 & 2: 0

## 2024-03-04 NOTE — PROGRESS NOTES
BEDTIME 180 tablet 0    levothyroxine (SYNTHROID) 88 MCG tablet Take 1 tablet by mouth once daily on an empty stomach 90 tablet 1    diflorasone (PSORCON) 0.05 % cream APPLY TO AFFECTED AREA 3 TIMES A DAY 60 g 2    diclofenac sodium (VOLTAREN) 1 % GEL Apply 2 g topically 4 times daily (Patient taking differently: Apply 2 g topically as needed) 150 g 5    calcium carbonate (OSCAL) 500 MG TABS tablet Take 1 tablet by mouth daily      vitamin C (ASCORBIC ACID) 500 MG tablet Take 1 tablet by mouth daily      Biotin 1000 MCG CHEW Take by mouth      vitamin B-12 (CYANOCOBALAMIN) 100 MCG tablet Take 1 tablet by mouth daily      loratadine (CLARITIN) 10 MG capsule Take 1 capsule by mouth daily      vitamin D (CHOLECALCIFEROL) 1000 UNIT TABS tablet Take 1 tablet by mouth daily      therapeutic multivitamin-minerals (THERAGRAN-M) tablet Take 1 tablet by mouth daily         Tobacco use history updated.   Social History     Tobacco Use   Smoking Status Never   Smokeless Tobacco Never        Nursing note reviewed.    Vitals:    03/04/24 1043   BP: 118/82   Site: Left Upper Arm   Position: Sitting   Cuff Size: Medium Adult   Pulse: 69   Temp: 97.9 °F (36.6 °C)   TempSrc: Oral   SpO2: 100%   Weight: 73.5 kg (162 lb)          BP Readings from Last 3 Encounters:   03/04/24 118/82   10/30/23 118/78   04/18/23 118/82     Wt Readings from Last 3 Encounters:   03/04/24 73.5 kg (162 lb)   10/30/23 72.6 kg (160 lb)   04/18/23 74.8 kg (165 lb)     Body mass index is 25.37 kg/m².    No results found for this visit on 03/04/24.      Physical Exam  Vitals and nursing note reviewed.   Constitutional:       General: She is not in acute distress.     Appearance: She is well-developed. She is not diaphoretic.   HENT:      Head: Normocephalic.      Nose: Congestion (right nares with reddish purple swelling, no discharge) present.      Mouth/Throat:      Mouth: Mucous membranes are moist.      Pharynx: Oropharynx is clear. No oropharyngeal exudate

## 2024-03-06 LAB — MAMMOGRAPHY, EXTERNAL: NORMAL

## 2024-03-18 DIAGNOSIS — I88.9 LYMPHADENITIS: ICD-10-CM

## 2024-03-18 DIAGNOSIS — M54.2 NECK PAIN: ICD-10-CM

## 2024-03-18 DIAGNOSIS — R59.0 LYMPHADENOPATHY OF LEFT CERVICAL REGION: ICD-10-CM

## 2024-03-18 NOTE — RESULT ENCOUNTER NOTE
This ultrasound report shows a small less than 1 cm reactive lymph node, but nothing actually in the area of concern.  This is reassuring and I do not think we need to do additional follow-up.    Not routed for staff call but released to [facility] only.

## 2024-03-23 DIAGNOSIS — F41.1 GAD (GENERALIZED ANXIETY DISORDER): ICD-10-CM

## 2024-03-23 DIAGNOSIS — F41.9 ANXIETY: ICD-10-CM

## 2024-03-23 DIAGNOSIS — F13.20 BENZODIAZEPINE DEPENDENCE (HCC): ICD-10-CM

## 2024-03-25 NOTE — TELEPHONE ENCOUNTER
150-199 MG/DL  HIGH: 200-499 MG/DL  VERY HIGH: > OR = 500 MG/DL    \"Nonfasting specimens may have modest increases in Triglyceride   levels\" ([facility] [employer] [facility], 2019)       HDL   Date Value Ref Range Status   10/30/2023 64 (H) 40 - 60 mg/dL Final     LDL Calculated   Date Value Ref Range Status   10/30/2023 105 (H) <100 mg/dL Final     VLDL   Date Value Ref Range Status   05/31/2022 31 4 - 38 MG/DL Final       A1C:    No results found for: \"LABA1C\", \"[id number]\"    TSH:    TSH   Date Value Ref Range Status   10/30/2023 1.05 0.27 - 4.20 uIU/mL Final   04/25/2018 2.73 0.27 - 4.20 uIU/mL Final     TSH Reflex FT4   Date Value Ref Range Status   04/18/2019 2.84 0.27 - 4.20 uIU/mL Final     TSH, High Sensitivity   Date Value Ref Range Status   04/19/2021 2.850 0.270 - 4.20 uIu/ml Final     Comment:             Patients with high levels of Biotin intake (ie >5mg/day) may have falsely decreased TSHS   levels.  Samples collected within 8 hours of Biotin intake may require additional information for   diagnosis.         UA:  Color, UA   Date Value Ref Range Status   11/27/2012 LT.YELLOW (A) UYELL Final     Clarity, UA   Date Value Ref Range Status   11/27/2012 CLEAR CLEAR Final     Glucose, Urine   Date Value Ref Range Status   11/27/2012 NEGATIVE NEG MG/DL Final     Bilirubin Urine   Date Value Ref Range Status   11/27/2012 NEGATIVE NEG MG/DL Final     Ketones, Urine   Date Value Ref Range Status   11/27/2012 NEGATIVE NEG MG/DL Final     Specific Gravity, UA   Date Value Ref Range Status   11/27/2012 1.009 1.001 - 1.035 Final     Blood, Urine   Date Value Ref Range Status   11/27/2012 NEGATIVE NEG Final     pH, Urine   Date Value Ref Range Status   11/27/2012 7.5 5.0 - 8.0 Final     Protein, UA   Date Value Ref Range Status   11/27/2012 NEGATIVE NEG MG/DL Final     Urobilinogen, Urine   Date Value Ref Range Status   11/27/2012 0.2 0.2 - 1.0 EU/DL Final     Nitrite Urine, Quantitative   Date Value Ref Range Status   11/27/2012

## 2024-04-01 RX ORDER — ALPRAZOLAM 0.5 MG/1
TABLET ORAL
Qty: 180 TABLET | Refills: 0 | Status: SHIPPED | OUTPATIENT
Start: 2024-04-01 | End: 2024-09-28

## 2024-05-07 ENCOUNTER — OFFICE VISIT (OUTPATIENT)
Dept: FAMILY MEDICINE CLINIC | Age: 59
End: 2024-05-07
Payer: COMMERCIAL

## 2024-05-07 VITALS
WEIGHT: 163 LBS | OXYGEN SATURATION: 98 % | DIASTOLIC BLOOD PRESSURE: 72 MMHG | HEART RATE: 60 BPM | SYSTOLIC BLOOD PRESSURE: 112 MMHG | BODY MASS INDEX: 25.53 KG/M2

## 2024-05-07 DIAGNOSIS — Z29.11 NEED FOR RSV IMMUNIZATION: ICD-10-CM

## 2024-05-07 DIAGNOSIS — R92.343 EXTREMELY DENSE TISSUE OF BOTH BREASTS ON MAMMOGRAPHY: ICD-10-CM

## 2024-05-07 DIAGNOSIS — R59.0 LYMPHADENOPATHY OF LEFT CERVICAL REGION: ICD-10-CM

## 2024-05-07 DIAGNOSIS — F13.20 BENZODIAZEPINE DEPENDENCE (HCC): Primary | ICD-10-CM

## 2024-05-07 DIAGNOSIS — F41.1 GAD (GENERALIZED ANXIETY DISORDER): ICD-10-CM

## 2024-05-07 PROCEDURE — 99214 OFFICE O/P EST MOD 30 MIN: CPT | Performed by: FAMILY MEDICINE

## 2024-05-07 RX ORDER — BUSPIRONE HYDROCHLORIDE 10 MG/1
10 TABLET ORAL 2 TIMES DAILY PRN
Qty: 60 TABLET | Refills: 2 | Status: SHIPPED | OUTPATIENT
Start: 2024-05-07 | End: 2024-08-05

## 2024-05-07 NOTE — PROGRESS NOTES
Chief Complaint   Patient presents with    6 Month Follow-Up     On multiple issues    Anxiety     Recheck on anxiety and benzo dependence    Hypothyroidism     TSH good on current med in October       [place] NARRATIVE:    [place] last week for vacation.   Psoriasis better with sun.      Lymphadenopathy still present (removed prior x 2) -- imaging performed last time. Remote ENT consult.      Seeing specialty care for right thumb and right wrist ganglion cyst.      Had pap and mammo with Dr. Rodriguez, who is following dense breast.      I manage her anxiety which is significant and she is currently benzo dependent and almost always has alcohol in her annual urine drug screen.  Her hypothyroidism is controlled with TSH good on current medication last fall.    Patient is established unless otherwise noted.  Above chief complaint and [place] obtained by this physician provider.     Review of Systems   Constitutional:  Negative for activity change, chills, fatigue and fever.   HENT:  Negative for congestion.    Respiratory:  Negative for cough, chest tightness, shortness of breath and wheezing.    Cardiovascular:  Negative for chest pain and leg swelling.   Gastrointestinal:  Negative for abdominal pain.   Musculoskeletal:  Negative for back pain and myalgias.   Skin:  Negative for rash.   Psychiatric/Behavioral:  Negative for behavioral problems and dysphoric mood. The patient is nervous/anxious. The patient is not hyperactive.        Allergies   Allergen Reactions    Tape [Adhesive Tape] Other (See Comments)     Blisters skin    Tylenol [Acetaminophen]     Dextromethorphan Hbr Nausea And Vomiting     Allergy historyupdated.    Outpatient Medications Marked as Taking for the 24 encounter (Office Visit) with Yuliya Delarosa MD   Medication Sig Dispense Refill    [] respiratory syncytial vaccine, adjuvanted (AREXVY) 120 MCG/0.5ML injection Inject 0.5 mLs into the muscle once for 1 dose 0.5 mL 0    busPIRone (BUSPAR) 10

## 2024-05-11 LAB
6MAM UR QL: NOT DETECTED
7AMINOCLONAZEPAM UR QL: NOT DETECTED
A-OH ALPRAZ UR QL: PRESENT
ALPHA-OH-MIDAZOLAM, URINE: NOT DETECTED
ALPRAZ UR QL: PRESENT
AMPHET UR QL SCN: NOT DETECTED
ANNOTATION COMMENT IMP: NORMAL
ANNOTATION COMMENT IMP: NORMAL
BARBITURATES UR QL: NEGATIVE
BUPRENORPHINE UR QL: NOT DETECTED
BZE UR QL: NEGATIVE
CARBOXYTHC UR QL: NEGATIVE
CARISOPRODOL UR QL: NEGATIVE
CLONAZEPAM UR QL: NOT DETECTED
CODEINE UR QL: NOT DETECTED
CREAT UR-MCNC: 176.2 MG/DL (ref 20–400)
DIAZEPAM UR QL: NOT DETECTED
ETHYL GLUCURONIDE UR QL: NORMAL
FENTANYL UR QL: NOT DETECTED
GABAPENTIN: NOT DETECTED
HYDROCODONE UR QL: NOT DETECTED
HYDROMORPHONE UR QL: NOT DETECTED
LORAZEPAM UR QL: NOT DETECTED
MDA UR QL: NOT DETECTED
MDEA UR QL: NOT DETECTED
MDMA UR QL: NOT DETECTED
MEPERIDINE UR QL: NOT DETECTED
METHADONE UR QL: NEGATIVE
METHAMPHET UR QL: NOT DETECTED
MIDAZOLAM UR QL SCN: NOT DETECTED
MORPHINE UR QL: NOT DETECTED
NALOXONE: NOT DETECTED
NORBUPRENORPHINE UR QL CFM: NOT DETECTED
NORDIAZEPAM UR QL: NOT DETECTED
NORFENTANYL UR QL: NOT DETECTED
NORHYDROCODONE UR QL CFM: NOT DETECTED
NOROXYCODONE UR QL CFM: NOT DETECTED
NOROXYMORPHONE, URINE: NOT DETECTED
OXAZEPAM UR QL: NOT DETECTED
OXYCODONE UR QL: NOT DETECTED
OXYMORPHONE UR QL: NOT DETECTED
PATHOLOGY STUDY: NORMAL
PCP UR QL: NEGATIVE
PHENTERMINE UR QL: NOT DETECTED
PPAA UR QL: NOT DETECTED
PREGABALIN: NOT DETECTED
SERVICE CMNT-IMP: NORMAL
TAPENTADOL UR QL SCN: NOT DETECTED
TAPENTADOL-O-SULFATE, URINE: NOT DETECTED
TEMAZEPAM UR QL: NOT DETECTED
TRAMADOL UR QL: NEGATIVE
ZOLPIDEM UR QL: NOT DETECTED

## 2024-05-28 DIAGNOSIS — E03.9 ACQUIRED HYPOTHYROIDISM: ICD-10-CM

## 2024-05-28 RX ORDER — LEVOTHYROXINE SODIUM 88 UG/1
TABLET ORAL
Qty: 90 TABLET | Refills: 1 | Status: SHIPPED | OUTPATIENT
Start: 2024-05-28

## 2024-05-28 NOTE — TELEPHONE ENCOUNTER
MG/DL    \"Nonfasting specimens may have modest increases in Triglyceride   levels\" ([facility], 2019)       HDL   Date Value Ref Range Status   10/30/2023 64 (H) 40 - 60 mg/dL Final     VLDL   Date Value Ref Range Status   05/31/2022 31 4 - 38 MG/DL Final     VLDL Cholesterol Calculated   Date Value Ref Range Status   10/30/2023 15 Not Established mg/dL Final       A1C:    No results found for: \"LABA1C\", \"[id number]\"    TSH:    TSH   Date Value Ref Range Status   10/30/2023 1.05 0.27 - 4.20 uIU/mL Final   04/25/2018 2.73 0.27 - 4.20 uIU/mL Final     TSH Reflex FT4   Date Value Ref Range Status   04/18/2019 2.84 0.27 - 4.20 uIU/mL Final     TSH, High Sensitivity   Date Value Ref Range Status   04/19/2021 2.850 0.270 - 4.20 uIu/ml Final     Comment:             Patients with high levels of Biotin intake (ie >5mg/day) may have falsely decreased TSHS   levels.  Samples collected within 8 hours of Biotin intake may require additional information for   diagnosis.         UA:  Color, UA   Date Value Ref Range Status   11/27/2012 LT.YELLOW (A) UYELL Final     Clarity, UA   Date Value Ref Range Status   11/27/2012 CLEAR CLEAR Final     Bilirubin Urine   Date Value Ref Range Status   11/27/2012 NEGATIVE NEG MG/DL Final     Ketones, Urine   Date Value Ref Range Status   11/27/2012 NEGATIVE NEG MG/DL Final     Blood, Urine   Date Value Ref Range Status   11/27/2012 NEGATIVE NEG Final     Protein, UA   Date Value Ref Range Status   11/27/2012 NEGATIVE NEG MG/DL Final     Urobilinogen, Urine   Date Value Ref Range Status   11/27/2012 0.2 0.2 - 1.0 EU/DL Final     Nitrite Urine, Quantitative   Date Value Ref Range Status   11/27/2012 NEGATIVE NEG Final     Leukocyte Esterase, Urine   Date Value Ref Range Status   11/27/2012 NEGATIVE NEG Final     RBC, UA   Date Value Ref Range Status   11/27/2012 1 0 - 6 /HPF Final     WBC, UA   Date Value Ref Range Status   11/27/2012 1 0 - 5 /HPF Final     Bacteria, UA   Date Value Ref Range

## 2024-08-27 DIAGNOSIS — E03.9 ACQUIRED HYPOTHYROIDISM: ICD-10-CM

## 2024-08-27 RX ORDER — LEVOTHYROXINE SODIUM 88 UG/1
TABLET ORAL
Qty: 90 TABLET | Refills: 1 | Status: SHIPPED | OUTPATIENT
Start: 2024-08-27

## 2024-09-10 ENCOUNTER — OFFICE VISIT (OUTPATIENT)
Dept: FAMILY MEDICINE CLINIC | Age: 59
End: 2024-09-10

## 2024-09-10 VITALS
OXYGEN SATURATION: 98 % | SYSTOLIC BLOOD PRESSURE: 122 MMHG | DIASTOLIC BLOOD PRESSURE: 74 MMHG | WEIGHT: 164 LBS | HEART RATE: 49 BPM | BODY MASS INDEX: 25.69 KG/M2

## 2024-09-10 DIAGNOSIS — Z23 NEED FOR INFLUENZA VACCINATION: ICD-10-CM

## 2024-09-10 DIAGNOSIS — G47.00 INSOMNIA, UNSPECIFIED TYPE: ICD-10-CM

## 2024-09-10 DIAGNOSIS — M75.82 ROTATOR CUFF TENDINITIS, LEFT: ICD-10-CM

## 2024-09-10 DIAGNOSIS — E03.9 ACQUIRED HYPOTHYROIDISM: ICD-10-CM

## 2024-09-10 DIAGNOSIS — Z00.00 WELL ADULT EXAM: ICD-10-CM

## 2024-09-10 DIAGNOSIS — F41.1 GAD (GENERALIZED ANXIETY DISORDER): Primary | ICD-10-CM

## 2024-09-10 DIAGNOSIS — Z29.11 NEED FOR RSV IMMUNIZATION: ICD-10-CM

## 2024-09-10 DIAGNOSIS — M54.2 NECK PAIN: ICD-10-CM

## 2024-09-10 PROBLEM — F13.20 BENZODIAZEPINE DEPENDENCE (HCC): Status: RESOLVED | Noted: 2020-10-19 | Resolved: 2024-09-10

## 2024-09-17 ENCOUNTER — PATIENT MESSAGE (OUTPATIENT)
Dept: FAMILY MEDICINE CLINIC | Age: 59
End: 2024-09-17

## 2024-09-17 DIAGNOSIS — R05.1 ACUTE COUGH: Primary | ICD-10-CM

## 2024-09-17 DIAGNOSIS — U07.1 COVID-19: ICD-10-CM

## 2024-09-17 RX ORDER — BENZONATATE 100 MG/1
100-200 CAPSULE ORAL 3 TIMES DAILY PRN
Qty: 60 CAPSULE | Refills: 0 | Status: SHIPPED | OUTPATIENT
Start: 2024-09-17 | End: 2024-09-24

## 2024-10-21 NOTE — RESULT ENCOUNTER NOTE
X-ray of cervical spine shows some moderate degenerative change, okay to discuss in detail in few days at office visit    Not routed for staff call but released to [facility] only.

## 2024-10-23 ENCOUNTER — OFFICE VISIT (OUTPATIENT)
Dept: FAMILY MEDICINE CLINIC | Age: 59
End: 2024-10-23
Payer: COMMERCIAL

## 2024-10-23 VITALS
DIASTOLIC BLOOD PRESSURE: 78 MMHG | OXYGEN SATURATION: 98 % | SYSTOLIC BLOOD PRESSURE: 106 MMHG | BODY MASS INDEX: 25.84 KG/M2 | HEART RATE: 62 BPM | WEIGHT: 165 LBS

## 2024-10-23 DIAGNOSIS — G89.29 CHRONIC NONINTRACTABLE HEADACHE, UNSPECIFIED HEADACHE TYPE: ICD-10-CM

## 2024-10-23 DIAGNOSIS — M47.812 CERVICAL SPINE ARTHRITIS: Primary | ICD-10-CM

## 2024-10-23 DIAGNOSIS — R51.9 CHRONIC NONINTRACTABLE HEADACHE, UNSPECIFIED HEADACHE TYPE: ICD-10-CM

## 2024-10-23 DIAGNOSIS — L40.9 SCALP PSORIASIS: ICD-10-CM

## 2024-10-23 PROCEDURE — 99214 OFFICE O/P EST MOD 30 MIN: CPT | Performed by: FAMILY MEDICINE

## 2024-10-23 RX ORDER — CLOBETASOL PROPIONATE 0.5 MG/ML
2 LOTION TOPICAL 2 TIMES DAILY
Qty: 59 ML | Refills: 5 | Status: SHIPPED | OUTPATIENT
Start: 2024-10-23

## 2024-10-23 RX ORDER — ALPRAZOLAM 0.5 MG
0.5 TABLET ORAL
COMMUNITY

## 2024-10-23 ASSESSMENT — ENCOUNTER SYMPTOMS
WHEEZING: 0
ABDOMINAL PAIN: 0
CHEST TIGHTNESS: 0
SHORTNESS OF BREATH: 0
BACK PAIN: 0
COUGH: 0

## 2024-10-23 NOTE — PROGRESS NOTES
into the chart.    Review of Systems   Constitutional:  Negative for activity change, chills, fatigue and fever.   HENT:  Negative for congestion.    Respiratory:  Negative for cough, chest tightness, shortness of breath and wheezing.    Cardiovascular:  Negative for chest pain and leg swelling.   Gastrointestinal:  Negative for abdominal pain.   Musculoskeletal:  Positive for neck pain and neck stiffness. Negative for back pain and myalgias.   Skin:  Positive for rash (itchy scalp rash has recurred).   Neurological:  Positive for numbness (left cheek) and headaches.   Psychiatric/Behavioral:  Negative for behavioral problems and dysphoric mood.        Allergies   Allergen Reactions    Tape [Adhesive Tape] Other (See Comments)     Blisters skin    Tylenol [Acetaminophen]     Dextroamphetamine Nausea And Vomiting    Dextromethorphan Hbr Nausea And Vomiting     Allergy historyupdated.    Outpatient Medications Marked as Taking for the 10/23/24 encounter (Office Visit) with Yuliya Delarosa MD   Medication Sig Dispense Refill    clobetasol propionate 0.05 % LOTN lotion Apply 2 mLs topically 2 times daily 59 mL 5    albuterol sulfate HFA (VENTOLIN HFA) 108 (90 Base) MCG/ACT inhaler Inhale 2 puffs into the lungs 4 times daily as needed for Wheezing 18 g 5    tiZANidine (ZANAFLEX) 4 MG tablet Take 1 tablet by mouth nightly as needed (sleep or muscle spasm) 30 tablet 1    levothyroxine (SYNTHROID) 88 MCG tablet Take 1 tablet by mouth once daily on an empty stomach 90 tablet 1    Pyridoxine HCl (VITAMIN B-6 PO) Take by mouth      diflorasone (PSORCON) 0.05 % cream APPLY TO AFFECTED AREA 3 TIMES A DAY 60 g 2    diclofenac sodium (VOLTAREN) 1 % GEL Apply 2 g topically 4 times daily (Patient taking differently: Apply 2 g topically as needed) 150 g 5    calcium carbonate (OSCAL) 500 MG TABS tablet Take 1 tablet by mouth daily      vitamin C (ASCORBIC ACID) 500 MG tablet Take 1 tablet by mouth daily      Biotin 1000 MCG CHEW Take

## 2024-11-06 NOTE — RESULT ENCOUNTER NOTE
Cervical spine MRI shows multilevel degenerative change and 1 particular level was severe change and compromise of nerve exit pathways.  Dr. Fernandez should be able to review this for your appointment next month

## 2024-12-10 ENCOUNTER — HOSPITAL ENCOUNTER (OUTPATIENT)
Dept: GENERAL RADIOLOGY | Age: 59
Discharge: HOME | End: 2024-12-10
Attending: NEUROLOGICAL SURGERY

## 2024-12-10 ENCOUNTER — HOSPITAL ENCOUNTER (OUTPATIENT)
Dept: MRI IMAGING | Age: 59
Discharge: HOME | End: 2024-12-10
Attending: NEUROLOGICAL SURGERY

## 2024-12-10 DIAGNOSIS — Z00.6 EXAMINATION FOR NORMAL COMPARISON OR CONTROL IN CLINICAL RESEARCH: ICD-10-CM

## 2024-12-20 ENCOUNTER — OFFICE VISIT (OUTPATIENT)
Dept: NEUROSURGERY | Age: 59
End: 2024-12-20
Payer: COMMERCIAL

## 2024-12-20 VITALS
HEART RATE: 67 BPM | DIASTOLIC BLOOD PRESSURE: 98 MMHG | BODY MASS INDEX: 26.53 KG/M2 | HEIGHT: 67 IN | SYSTOLIC BLOOD PRESSURE: 142 MMHG | WEIGHT: 169 LBS | OXYGEN SATURATION: 97 %

## 2024-12-20 DIAGNOSIS — M40.00 KYPHOSIS (ACQUIRED) (POSTURAL): ICD-10-CM

## 2024-12-20 DIAGNOSIS — M54.12 CERVICAL RADICULOPATHY AT C6: Primary | ICD-10-CM

## 2024-12-20 DIAGNOSIS — M54.12 CERVICAL RADICULOPATHY AT C7: ICD-10-CM

## 2024-12-20 DIAGNOSIS — M51.9 DISORDER OF INTERVERTEBRAL DISC OF LUMBAR SPINE: ICD-10-CM

## 2024-12-20 DIAGNOSIS — M47.16 LUMBAR SPONDYLOSIS WITH MYELOPATHY: ICD-10-CM

## 2024-12-20 PROCEDURE — 99204 OFFICE O/P NEW MOD 45 MIN: CPT | Performed by: NEUROLOGICAL SURGERY

## 2024-12-20 RX ORDER — METHYLPREDNISOLONE 4 MG/1
TABLET ORAL
Qty: 1 KIT | Refills: 0 | Status: SHIPPED | OUTPATIENT
Start: 2024-12-20

## 2024-12-20 NOTE — PROGRESS NOTES
NEUROSURGERY OFFICE CONSULT     Office Visit: 2024   Patient: Karlene Finnegan   MRN: [id number]  : 1965   Neurosurgeon: Dada Fernandez DO  Referring: Yuliya Delarosa MD  Primary: Yuliya Delarosa MD    History:  CC:   Chief Complaint   Patient presents with    New Patient     Cervical spine arthritis       Left arm pain     HPI: Karlene Finnegan is a 59 y.o. right handed female who presents with the above stated complaint. Patient stated she started out with swollen lymph nodes and her neck has been hurting her they recently got an MRI and it shows am Pinched nerve location , 6 months  duration ,The patient's health questionnaire was reviewed with the patient and signed today.  The pertinent findings are noted below and they were discussed with the patient.  The significant past medical/surgical history was reviewed, and notes were made.  A comprehensive review of systems was made to determine the relationship of the patient's current complaint and symptoms.    Review of Systems:  Other than the symptoms described in the history of present illness, she denies headache, nausea, vomiting, diarrhea, constipation,loss of bowel or bladder, change in vision, sob, chest pain, cough, wheezing, l weight change, dizziness, numbness, tingling, or pain in the neck, back, chest, abdomen or extremities. All other systems are discussed and reviewed as normal.    Past Medical History:   Diagnosis Date    Anxiety     Breast mass, right 2017    IBS (irritable bowel syndrome)     Kidney stones     Thyroid disease      Past Surgical History:   Procedure Laterality Date    BREAST SURGERY Left     MASS REMOVAL     HYSTERECTOMY (CERVIX STATUS UNKNOWN)      Right ovary and fall. tube removed.    LITHOTRIPSY      NECK SURGERY      swollen gland removed from right neck     Current Outpatient Medications   Medication Sig Dispense Refill    clobetasol propionate 0.05 % LOTN lotion Apply 2 mLs topically 2 times daily 59 mL 5

## 2024-12-20 NOTE — PATIENT INSTRUCTIONS
Dr. Fernandez ordered you physical therapy at [facility].    Medrol pack will be sent to your pharmacy. [facility]

## 2025-01-08 ENCOUNTER — TELEPHONE (OUTPATIENT)
Dept: NEUROSURGERY | Age: 60
End: 2025-01-08

## 2025-01-08 NOTE — TELEPHONE ENCOUNTER
Patient called and asked if she can move her appointment till after she [place] therapy which is February 5 so I rescheduled her for Feb 7 th then she asked if he would refill a prescription that Dr. Cooney had prescribed for her and I stated he does not write prescriptions unless he is doing surgery on them she said really and hung up.

## 2025-01-26 ENCOUNTER — HOSPITAL ENCOUNTER (INPATIENT)
Age: 60
LOS: 1 days | Discharge: HOME | DRG: 310 | End: 2025-01-27
Attending: STUDENT IN AN ORGANIZED HEALTH CARE EDUCATION/TRAINING PROGRAM | Admitting: HOSPITALIST
Payer: COMMERCIAL

## 2025-01-26 ENCOUNTER — APPOINTMENT (OUTPATIENT)
Dept: GENERAL RADIOLOGY | Age: 60
DRG: 310 | End: 2025-01-26
Attending: STUDENT IN AN ORGANIZED HEALTH CARE EDUCATION/TRAINING PROGRAM
Payer: COMMERCIAL

## 2025-01-26 DIAGNOSIS — I48.91 ATRIAL FIBRILLATION, UNSPECIFIED TYPE (HCC): ICD-10-CM

## 2025-01-26 DIAGNOSIS — R00.2 PALPITATIONS: ICD-10-CM

## 2025-01-26 DIAGNOSIS — E83.42 HYPOMAGNESEMIA: ICD-10-CM

## 2025-01-26 DIAGNOSIS — I48.91 NEW ONSET A-FIB (HCC): Primary | ICD-10-CM

## 2025-01-26 LAB
ALBUMIN SERPL-MCNC: 4.5 G/DL (ref 3.4–5)
ALBUMIN/GLOB SERPL: 1.7 {RATIO} (ref 1.1–2.2)
ALP SERPL-CCNC: 92 U/L (ref 40–129)
ALT SERPL-CCNC: 16 U/L (ref 10–40)
ANION GAP SERPL CALCULATED.3IONS-SCNC: 14 MMOL/L (ref 4–16)
AST SERPL-CCNC: 20 U/L (ref 15–37)
BASOPHILS # BLD: 0.03 K/UL
BASOPHILS NFR BLD: 1 % (ref 0–1)
BILIRUB SERPL-MCNC: 0.6 MG/DL (ref 0–1)
BILIRUB UR QL STRIP: NEGATIVE
BUN SERPL-MCNC: 17 MG/DL (ref 6–23)
CALCIUM SERPL-MCNC: 9.2 MG/DL (ref 8.3–10.6)
CHLORIDE SERPL-SCNC: 105 MMOL/L (ref 99–110)
CLARITY UR: CLEAR
CO2 SERPL-SCNC: 24 MMOL/L (ref 21–32)
COLOR UR: YELLOW
CREAT SERPL-MCNC: 0.8 MG/DL (ref 0.6–1.1)
EKG ATRIAL RATE: 136 BPM
EKG DIAGNOSIS: NORMAL
EKG Q-T INTERVAL: 302 MS
EKG QRS DURATION: 84 MS
EKG QTC CALCULATION (BAZETT): 480 MS
EKG R AXIS: -29 DEGREES
EKG T AXIS: 125 DEGREES
EKG VENTRICULAR RATE: 152 BPM
EOSINOPHIL # BLD: 0.13 K/UL
EOSINOPHILS RELATIVE PERCENT: 2 % (ref 0–3)
EPI CELLS #/AREA URNS HPF: NORMAL /HPF
ERYTHROCYTE [DISTWIDTH] IN BLOOD BY AUTOMATED COUNT: 12.3 % (ref 11.7–14.9)
ETHANOLAMINE SERPL-MCNC: <10 MG/DL (ref 0–0.08)
GFR, ESTIMATED: 84 ML/MIN/1.73M2
GLUCOSE SERPL-MCNC: 90 MG/DL (ref 74–99)
GLUCOSE UR STRIP-MCNC: NEGATIVE MG/DL
HCT VFR BLD AUTO: 44.6 % (ref 37–47)
HGB BLD-MCNC: 15.1 G/DL (ref 12.5–16)
HGB UR QL STRIP.AUTO: ABNORMAL
IMM GRANULOCYTES # BLD AUTO: 0.01 K/UL
IMM GRANULOCYTES NFR BLD: 0 %
KETONES UR STRIP-MCNC: NEGATIVE MG/DL
LEUKOCYTE ESTERASE UR QL STRIP: NEGATIVE
LIPASE SERPL-CCNC: 44 U/L (ref 13–60)
LYMPHOCYTES NFR BLD: 1.83 K/UL
LYMPHOCYTES RELATIVE PERCENT: 32 % (ref 24–44)
MAGNESIUM SERPL-MCNC: 1.7 MG/DL (ref 1.8–2.4)
MCH RBC QN AUTO: 31.5 PG (ref 27–31)
MCHC RBC AUTO-ENTMCNC: 33.9 G/DL (ref 32–36)
MCV RBC AUTO: 93.1 FL (ref 78–100)
MONOCYTES NFR BLD: 0.6 K/UL
MONOCYTES NFR BLD: 11 % (ref 0–4)
NEUTROPHILS NFR BLD: 55 % (ref 36–66)
NEUTS SEG NFR BLD: 3.14 K/UL
NITRITE UR QL STRIP: NEGATIVE
PH UR STRIP: 7 [PH] (ref 5–8)
PLATELET # BLD AUTO: 297 K/UL (ref 140–440)
PMV BLD AUTO: 8.9 FL (ref 7.5–11.1)
POTASSIUM SERPL-SCNC: 3.8 MMOL/L (ref 3.5–5.1)
PROT SERPL-MCNC: 7.2 G/DL (ref 6.4–8.2)
PROT UR STRIP-MCNC: NEGATIVE MG/DL
RBC # BLD AUTO: 4.79 M/UL (ref 4.2–5.4)
RBC #/AREA URNS HPF: NORMAL /HPF
SODIUM SERPL-SCNC: 143 MMOL/L (ref 135–145)
SP GR UR STRIP: 1.01 (ref 1–1.03)
TROPONIN I SERPL HS-MCNC: 7 NG/L (ref 0–13)
TROPONIN I SERPL HS-MCNC: <6 NG/L (ref 0–13)
TSH SERPL DL<=0.05 MIU/L-ACNC: 1.39 UIU/ML (ref 0.27–4.2)
UROBILINOGEN UR STRIP-ACNC: 0.2 EU/DL (ref 0–1)
WBC #/AREA URNS HPF: NORMAL /HPF
WBC OTHER # BLD: 5.7 K/UL (ref 4–10.5)

## 2025-01-26 PROCEDURE — 93010 ELECTROCARDIOGRAM REPORT: CPT | Performed by: INTERNAL MEDICINE

## 2025-01-26 PROCEDURE — 99285 EMERGENCY DEPT VISIT HI MDM: CPT

## 2025-01-26 PROCEDURE — 93005 ELECTROCARDIOGRAM TRACING: CPT | Performed by: STUDENT IN AN ORGANIZED HEALTH CARE EDUCATION/TRAINING PROGRAM

## 2025-01-26 PROCEDURE — 6370000000 HC RX 637 (ALT 250 FOR IP): Performed by: NURSE PRACTITIONER

## 2025-01-26 PROCEDURE — 96368 THER/DIAG CONCURRENT INF: CPT

## 2025-01-26 PROCEDURE — 6360000002 HC RX W HCPCS: Performed by: STUDENT IN AN ORGANIZED HEALTH CARE EDUCATION/TRAINING PROGRAM

## 2025-01-26 PROCEDURE — 96365 THER/PROPH/DIAG IV INF INIT: CPT

## 2025-01-26 PROCEDURE — 6370000000 HC RX 637 (ALT 250 FOR IP): Performed by: STUDENT IN AN ORGANIZED HEALTH CARE EDUCATION/TRAINING PROGRAM

## 2025-01-26 PROCEDURE — 2500000003 HC RX 250 WO HCPCS: Performed by: STUDENT IN AN ORGANIZED HEALTH CARE EDUCATION/TRAINING PROGRAM

## 2025-01-26 PROCEDURE — 2580000003 HC RX 258: Performed by: STUDENT IN AN ORGANIZED HEALTH CARE EDUCATION/TRAINING PROGRAM

## 2025-01-26 PROCEDURE — 1200000000 HC SEMI PRIVATE

## 2025-01-26 PROCEDURE — 2500000003 HC RX 250 WO HCPCS: Performed by: NURSE PRACTITIONER

## 2025-01-26 PROCEDURE — 71045 X-RAY EXAM CHEST 1 VIEW: CPT

## 2025-01-26 RX ORDER — ONDANSETRON 2 MG/ML
4 INJECTION INTRAMUSCULAR; INTRAVENOUS EVERY 6 HOURS PRN
Status: DISCONTINUED | OUTPATIENT
Start: 2025-01-26 | End: 2025-01-27 | Stop reason: HOSPADM

## 2025-01-26 RX ORDER — ASPIRIN 325 MG
325 TABLET ORAL ONCE
Status: COMPLETED | OUTPATIENT
Start: 2025-01-26 | End: 2025-01-26

## 2025-01-26 RX ORDER — SODIUM CHLORIDE 0.9 % (FLUSH) 0.9 %
5-40 SYRINGE (ML) INJECTION PRN
Status: DISCONTINUED | OUTPATIENT
Start: 2025-01-26 | End: 2025-01-27 | Stop reason: HOSPADM

## 2025-01-26 RX ORDER — ASCORBIC ACID 500 MG
500 TABLET ORAL DAILY
Status: DISCONTINUED | OUTPATIENT
Start: 2025-01-26 | End: 2025-01-27 | Stop reason: HOSPADM

## 2025-01-26 RX ORDER — 0.9 % SODIUM CHLORIDE 0.9 %
1000 INTRAVENOUS SOLUTION INTRAVENOUS ONCE
Status: COMPLETED | OUTPATIENT
Start: 2025-01-26 | End: 2025-01-26

## 2025-01-26 RX ORDER — VITAMIN B COMPLEX
1000 TABLET ORAL DAILY
Status: DISCONTINUED | OUTPATIENT
Start: 2025-01-26 | End: 2025-01-27 | Stop reason: HOSPADM

## 2025-01-26 RX ORDER — LEVOTHYROXINE SODIUM 88 UG/1
88 TABLET ORAL DAILY
Status: DISCONTINUED | OUTPATIENT
Start: 2025-01-27 | End: 2025-01-27 | Stop reason: HOSPADM

## 2025-01-26 RX ORDER — ONDANSETRON 4 MG/1
4 TABLET, ORALLY DISINTEGRATING ORAL EVERY 8 HOURS PRN
Status: DISCONTINUED | OUTPATIENT
Start: 2025-01-26 | End: 2025-01-27 | Stop reason: HOSPADM

## 2025-01-26 RX ORDER — SODIUM CHLORIDE 0.9 % (FLUSH) 0.9 %
5-40 SYRINGE (ML) INJECTION EVERY 12 HOURS SCHEDULED
Status: DISCONTINUED | OUTPATIENT
Start: 2025-01-26 | End: 2025-01-27 | Stop reason: HOSPADM

## 2025-01-26 RX ORDER — UBIDECARENONE 75 MG
100 CAPSULE ORAL DAILY
Status: DISCONTINUED | OUTPATIENT
Start: 2025-01-26 | End: 2025-01-27 | Stop reason: HOSPADM

## 2025-01-26 RX ORDER — LORAZEPAM 1 MG/1
1 TABLET ORAL ONCE
Status: COMPLETED | OUTPATIENT
Start: 2025-01-26 | End: 2025-01-26

## 2025-01-26 RX ORDER — POLYETHYLENE GLYCOL 3350 17 G/17G
17 POWDER, FOR SOLUTION ORAL DAILY PRN
Status: DISCONTINUED | OUTPATIENT
Start: 2025-01-26 | End: 2025-01-27 | Stop reason: HOSPADM

## 2025-01-26 RX ORDER — DILTIAZEM HYDROCHLORIDE 5 MG/ML
20 INJECTION INTRAVENOUS ONCE
Status: COMPLETED | OUTPATIENT
Start: 2025-01-26 | End: 2025-01-26

## 2025-01-26 RX ORDER — M-VIT,TX,IRON,MINS/CALC/FOLIC 27MG-0.4MG
1 TABLET ORAL DAILY
Status: DISCONTINUED | OUTPATIENT
Start: 2025-01-26 | End: 2025-01-27 | Stop reason: HOSPADM

## 2025-01-26 RX ORDER — SODIUM CHLORIDE 9 MG/ML
INJECTION, SOLUTION INTRAVENOUS PRN
Status: DISCONTINUED | OUTPATIENT
Start: 2025-01-26 | End: 2025-01-27 | Stop reason: HOSPADM

## 2025-01-26 RX ORDER — MAGNESIUM SULFATE 1 G/100ML
1000 INJECTION INTRAVENOUS ONCE
Status: COMPLETED | OUTPATIENT
Start: 2025-01-26 | End: 2025-01-26

## 2025-01-26 RX ADMIN — DEXTROSE MONOHYDRATE 15 MG/HR: 50 INJECTION, SOLUTION INTRAVENOUS at 20:15

## 2025-01-26 RX ADMIN — Medication 1 TABLET: at 17:12

## 2025-01-26 RX ADMIN — DILTIAZEM HYDROCHLORIDE 20 MG: 5 INJECTION, SOLUTION INTRAVENOUS at 11:52

## 2025-01-26 RX ADMIN — SODIUM CHLORIDE, PRESERVATIVE FREE 10 ML: 5 INJECTION INTRAVENOUS at 20:16

## 2025-01-26 RX ADMIN — LORAZEPAM 1 MG: 1 TABLET ORAL at 14:20

## 2025-01-26 RX ADMIN — APIXABAN 5 MG: 5 TABLET, FILM COATED ORAL at 20:16

## 2025-01-26 RX ADMIN — MAGNESIUM SULFATE HEPTAHYDRATE 1000 MG: 1 INJECTION, SOLUTION INTRAVENOUS at 12:23

## 2025-01-26 RX ADMIN — OXYCODONE HYDROCHLORIDE AND ACETAMINOPHEN 500 MG: 500 TABLET ORAL at 17:12

## 2025-01-26 RX ADMIN — APIXABAN 5 MG: 5 TABLET, FILM COATED ORAL at 17:12

## 2025-01-26 RX ADMIN — DEXTROSE MONOHYDRATE 5 MG/HR: 50 INJECTION, SOLUTION INTRAVENOUS at 12:01

## 2025-01-26 RX ADMIN — VITAM B12 100 MCG: 100 TAB at 17:12

## 2025-01-26 RX ADMIN — ASPIRIN 325 MG: 325 TABLET ORAL at 13:12

## 2025-01-26 RX ADMIN — CHOLECALCIFEROL TAB 25 MCG (1000 UNIT) 1000 UNITS: 25 TAB at 17:13

## 2025-01-26 RX ADMIN — SODIUM CHLORIDE 1000 ML: 9 INJECTION, SOLUTION INTRAVENOUS at 11:51

## 2025-01-26 ASSESSMENT — LIFESTYLE VARIABLES
HOW OFTEN DO YOU HAVE A DRINK CONTAINING ALCOHOL: NEVER
HOW MANY STANDARD DRINKS CONTAINING ALCOHOL DO YOU HAVE ON A TYPICAL DAY: PATIENT DOES NOT DRINK

## 2025-01-26 NOTE — PROGRESS NOTES
4 Eyes Skin Assessment     NAME:  Karlene Finnegan  YOB: 1965  MEDICAL RECORD NUMBER:  [id number]    The patient is being assessed for  Admission    I agree that at least one RN has performed a thorough Head to Toe Skin Assessment on the patient. ALL assessment sites listed below have been assessed.      Areas assessed by both nurses:    Head, Face, Ears, Shoulders, Back, Chest, Arms, Elbows, Hands, and Legs. Feet and Heels        Does the Patient have a Wound? No noted wound(s)       Isidro Prevention initiated by RN: No  Wound Care Orders initiated by RN: No    Pressure Injury (Stage 3,4, Unstageable, DTI, NWPT, and Complex wounds) if present, place Wound referral order by RN under : No    New Ostomies, if present place, Ostomy referral order under : No     Nurse 1 eSignature: Electronically signed by Reena Mccormack RN on 1/26/25 at 3:51 PM EST    **SHARE this note so that the co-signing nurse can place an eSignature**    Nurse 2 eSignature: Electronically signed by Ashtyn Barrow on 1/26/25 at 3:54 PM EST

## 2025-01-26 NOTE — ED PROVIDER NOTES
longer evident in Anterior leads  T wave inversion now evident in Lateral leads        Radiographs (if obtained):  [] The following radiograph was interpreted by myself in the absence of a radiologist:   [] Radiologist's Report Reviewed:  XR CHEST PORTABLE   Final Result        Radiologist's Report Reviewed:  MRI COMPARISON OF OUTSIDE FILMS    Result Date: 12/10/2024  Automatic administrative result for outside facility images loaded into PACS.  These images may be viewed in the PACS system.  No clinical documentation provided for this order.     OUTSIDE IMAGES LOADED IN PACS FOR REFERENCE ONLY.    XR COMPARISON OF OUTSIDE FILMS    Result Date: 12/10/2024  Automatic administrative result for outside facility images loaded into PACS.  These images may be viewed in the PACS system.  No clinical documentation provided for this order.     OUTSIDE IMAGES LOADED IN PACS FOR REFERENCE ONLY.      Procedures:  None      Chart review shows recent radiographs:  No results found.      Discussion with Other Professionals : Admitting Team      Records Reviewed : Other as noted above    Chronic conditions affecting care:  as noted above    Disposition Considerations (tests considered but not done, Shared Decision Making, Patient Expectation of Test or Treatment): as noted above    Patient was given the following medications:  Medications   dilTIAZem injection 20 mg (20 mg IntraVENous Given 1/26/25 1152)     Followed by   dilTIAZem 100 mg in dextrose 5 % 100 mL infusion (ADD-Lamont) (7.5 mg/hr IntraVENous Rate/Dose Change 1/26/25 1232)   magnesium sulfate 1000 mg in dextrose 5% 100 mL IVPB (1,000 mg IntraVENous New Bag 1/26/25 1223)   sodium chloride 0.9 % bolus 1,000 mL (1,000 mLs IntraVENous New Bag 1/26/25 1151)         Is this patient to be included in the SEP-1 Core Measure due to severe sepsis or septic shock?   No   Exclusion criteria - the patient is NOT to be included for SEP-1 Core Measure due to:  Infection is not

## 2025-01-26 NOTE — H&P
PSHX:  has a past surgical history that includes Neck surgery; Lithotripsy; Hysterectomy; and Breast surgery (Left).  Allergies:   Allergies   Allergen Reactions    Tape [Adhesive Tape] Other (See Comments)     Blisters skin    Tylenol [Acetaminophen]     Dextroamphetamine Nausea And Vomiting    Dextromethorphan Hbr Nausea And Vomiting     Fam HX:  family history includes Arthritis in her mother; Breast Cancer in her mother; Early Death in her father; Heart Attack in her father; High Blood Pressure in her father and mother; High Cholesterol in her father and mother.  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   Tobacco Use    Smoking status: Never    Smokeless tobacco: Never   Vaping Use    Vaping status: Never Used   Substance and Sexual Activity    Alcohol use: Yes     Alcohol/week: 2.0 standard drinks of alcohol     Types: 2 Glasses of wine per week     Comment: rarely    Drug use: No    Sexual activity: Yes     Partners: Male     Social Determinants of Health     Financial Resource Strain: Patient Declined (10/30/2023)    Overall Financial Resource Strain (CARDIA)     Difficulty of Paying Living Expenses: Patient declined   Transportation Needs: Unknown (10/30/2023)    PRAPARE - Transportation     Lack of Transportation (Non-Medical): Patient declined   Housing Stability: Unknown (1/22/2025)    Housing Stability Vital Sign     Number of Times Moved in the Last Year: 0     Homeless in the Last Year: No       Medications:   Medications:    sodium chloride flush  5-40 mL IntraVENous 2 times per day    apixaban  5 mg Oral BID      Infusions:    dilTIAZem 10 mg/hr (01/26/25 1301)    sodium chloride       PRN Meds: sodium chloride flush, 5-40 mL, PRN  sodium chloride, , PRN  ondansetron, 4 mg, Q8H

## 2025-01-26 NOTE — PLAN OF CARE
Problem: Respiratory - Adult  Goal: Achieves optimal ventilation and oxygenation  Outcome: Progressing     Problem: Cardiovascular - Adult  Goal: Maintains optimal cardiac output and hemodynamic stability  Outcome: Progressing  Goal: Absence of cardiac dysrhythmias or at baseline  Outcome: Progressing     Problem: Metabolic/Fluid and Electrolytes - Adult  Goal: Electrolytes maintained within normal limits  Outcome: Progressing  Goal: Hemodynamic stability and optimal renal function maintained  Outcome: Progressing     Problem: Hematologic - Adult  Goal: Maintains hematologic stability  Outcome: Progressing

## 2025-01-27 ENCOUNTER — APPOINTMENT (OUTPATIENT)
Age: 60
DRG: 310 | End: 2025-01-27
Payer: COMMERCIAL

## 2025-01-27 VITALS
DIASTOLIC BLOOD PRESSURE: 58 MMHG | RESPIRATION RATE: 28 BRPM | TEMPERATURE: 98.6 F | HEIGHT: 67 IN | HEART RATE: 94 BPM | BODY MASS INDEX: 25.9 KG/M2 | SYSTOLIC BLOOD PRESSURE: 93 MMHG | OXYGEN SATURATION: 97 % | WEIGHT: 165 LBS

## 2025-01-27 LAB
ANION GAP SERPL CALCULATED.3IONS-SCNC: 12 MMOL/L (ref 4–16)
BASOPHILS # BLD: 0.02 K/UL
BASOPHILS NFR BLD: 0 % (ref 0–1)
BUN SERPL-MCNC: 18 MG/DL (ref 6–23)
CALCIUM SERPL-MCNC: 8.2 MG/DL (ref 8.3–10.6)
CHLORIDE SERPL-SCNC: 106 MMOL/L (ref 99–110)
CO2 SERPL-SCNC: 23 MMOL/L (ref 21–32)
CREAT SERPL-MCNC: 0.8 MG/DL (ref 0.6–1.1)
ECHO AO ROOT DIAM: 3.4 CM
ECHO AO ROOT INDEX: 1.83 CM/M2
ECHO AV AREA PEAK VELOCITY: 2.8 CM2
ECHO AV AREA VTI: 2.4 CM2
ECHO AV AREA/BSA PEAK VELOCITY: 1.5 CM2/M2
ECHO AV AREA/BSA VTI: 1.3 CM2/M2
ECHO AV MEAN GRADIENT: 2 MMHG
ECHO AV MEAN VELOCITY: 0.6 M/S
ECHO AV PEAK GRADIENT: 2 MMHG
ECHO AV PEAK VELOCITY: 0.8 M/S
ECHO AV VELOCITY RATIO: 0.75
ECHO AV VTI: 14.9 CM
ECHO BSA: 1.88 M2
ECHO IVC PROX: 1.5 CM
ECHO LA AREA 4C: 10.2 CM2
ECHO LA DIAMETER INDEX: 1.34 CM/M2
ECHO LA DIAMETER: 2.5 CM
ECHO LA MAJOR AXIS: 4.1 CM
ECHO LA TO AORTIC ROOT RATIO: 0.74
ECHO LA VOL MOD A4C: 20 ML (ref 22–52)
ECHO LA VOLUME INDEX MOD A4C: 11 ML/M2 (ref 16–34)
ECHO LV E' LATERAL VELOCITY: 13.7 CM/S
ECHO LV E' SEPTAL VELOCITY: 9.48 CM/S
ECHO LV EDV A4C: 78 ML
ECHO LV EDV INDEX A4C: 42 ML/M2
ECHO LV EF PHYSICIAN: 65 %
ECHO LV EJECTION FRACTION A4C: 66 %
ECHO LV ESV A4C: 27 ML
ECHO LV ESV INDEX A4C: 15 ML/M2
ECHO LV FRACTIONAL SHORTENING: 48 % (ref 28–44)
ECHO LV INTERNAL DIMENSION DIASTOLE INDEX: 2.58 CM/M2
ECHO LV INTERNAL DIMENSION DIASTOLIC: 4.8 CM (ref 3.9–5.3)
ECHO LV INTERNAL DIMENSION SYSTOLIC INDEX: 1.34 CM/M2
ECHO LV INTERNAL DIMENSION SYSTOLIC: 2.5 CM
ECHO LV IVSD: 1 CM (ref 0.6–0.9)
ECHO LV MASS 2D: 147.8 G (ref 67–162)
ECHO LV MASS INDEX 2D: 79.5 G/M2 (ref 43–95)
ECHO LV POSTERIOR WALL DIASTOLIC: 0.8 CM (ref 0.6–0.9)
ECHO LV RELATIVE WALL THICKNESS RATIO: 0.33
ECHO LVOT AREA: 3.8 CM2
ECHO LVOT AV VTI INDEX: 0.64
ECHO LVOT DIAM: 2.2 CM
ECHO LVOT MEAN GRADIENT: 1 MMHG
ECHO LVOT PEAK GRADIENT: 1 MMHG
ECHO LVOT PEAK VELOCITY: 0.6 M/S
ECHO LVOT STROKE VOLUME INDEX: 19.4 ML/M2
ECHO LVOT SV: 36.1 ML
ECHO LVOT VTI: 9.5 CM
ECHO MV A VELOCITY: 0.62 M/S
ECHO MV E VELOCITY: 0.69 M/S
ECHO MV E/A RATIO: 1.11
ECHO MV E/E' LATERAL: 5.04
ECHO MV E/E' RATIO (AVERAGED): 6.16
ECHO MV E/E' SEPTAL: 7.28
ECHO RV INTERNAL DIMENSION: 3.2 CM
EKG ATRIAL RATE: 61 BPM
EKG DIAGNOSIS: NORMAL
EKG P AXIS: 66 DEGREES
EKG P-R INTERVAL: 150 MS
EKG Q-T INTERVAL: 446 MS
EKG QRS DURATION: 88 MS
EKG QTC CALCULATION (BAZETT): 448 MS
EKG R AXIS: -29 DEGREES
EKG T AXIS: 260 DEGREES
EKG VENTRICULAR RATE: 61 BPM
EOSINOPHIL # BLD: 0.17 K/UL
EOSINOPHILS RELATIVE PERCENT: 2 % (ref 0–3)
ERYTHROCYTE [DISTWIDTH] IN BLOOD BY AUTOMATED COUNT: 12.6 % (ref 11.7–14.9)
GFR, ESTIMATED: 84 ML/MIN/1.73M2
GLUCOSE SERPL-MCNC: 113 MG/DL (ref 74–99)
HCT VFR BLD AUTO: 41.2 % (ref 37–47)
HGB BLD-MCNC: 13.8 G/DL (ref 12.5–16)
IMM GRANULOCYTES # BLD AUTO: 0.02 K/UL
IMM GRANULOCYTES NFR BLD: 0 %
INR PPP: 1.3
LYMPHOCYTES NFR BLD: 2.43 K/UL
LYMPHOCYTES RELATIVE PERCENT: 34 % (ref 24–44)
MAGNESIUM SERPL-MCNC: 2 MG/DL (ref 1.8–2.4)
MCH RBC QN AUTO: 31.7 PG (ref 27–31)
MCHC RBC AUTO-ENTMCNC: 33.5 G/DL (ref 32–36)
MCV RBC AUTO: 94.7 FL (ref 78–100)
MONOCYTES NFR BLD: 0.69 K/UL
MONOCYTES NFR BLD: 10 % (ref 0–4)
NEUTROPHILS NFR BLD: 53 % (ref 36–66)
NEUTS SEG NFR BLD: 3.79 K/UL
PLATELET # BLD AUTO: 296 K/UL (ref 140–440)
PMV BLD AUTO: 9.3 FL (ref 7.5–11.1)
POTASSIUM SERPL-SCNC: 3.6 MMOL/L (ref 3.5–5.1)
PROTHROMBIN TIME: 16.4 SEC (ref 11.7–14.5)
RBC # BLD AUTO: 4.35 M/UL (ref 4.2–5.4)
SODIUM SERPL-SCNC: 141 MMOL/L (ref 135–145)
WBC OTHER # BLD: 7.1 K/UL (ref 4–10.5)

## 2025-01-27 PROCEDURE — 85025 COMPLETE CBC W/AUTO DIFF WBC: CPT

## 2025-01-27 PROCEDURE — 93306 TTE W/DOPPLER COMPLETE: CPT | Performed by: INTERNAL MEDICINE

## 2025-01-27 PROCEDURE — 93005 ELECTROCARDIOGRAM TRACING: CPT | Performed by: NURSE PRACTITIONER

## 2025-01-27 PROCEDURE — 85610 PROTHROMBIN TIME: CPT

## 2025-01-27 PROCEDURE — 2500000003 HC RX 250 WO HCPCS: Performed by: NURSE PRACTITIONER

## 2025-01-27 PROCEDURE — 93306 TTE W/DOPPLER COMPLETE: CPT

## 2025-01-27 PROCEDURE — 83735 ASSAY OF MAGNESIUM: CPT

## 2025-01-27 PROCEDURE — 99222 1ST HOSP IP/OBS MODERATE 55: CPT | Performed by: INTERNAL MEDICINE

## 2025-01-27 PROCEDURE — 6370000000 HC RX 637 (ALT 250 FOR IP): Performed by: NURSE PRACTITIONER

## 2025-01-27 PROCEDURE — 93010 ELECTROCARDIOGRAM REPORT: CPT | Performed by: INTERNAL MEDICINE

## 2025-01-27 PROCEDURE — 36415 COLL VENOUS BLD VENIPUNCTURE: CPT

## 2025-01-27 PROCEDURE — 80048 BASIC METABOLIC PNL TOTAL CA: CPT

## 2025-01-27 RX ORDER — ASPIRIN 81 MG/1
81 TABLET ORAL DAILY
Qty: 90 TABLET | Refills: 0 | Status: SHIPPED | OUTPATIENT
Start: 2025-01-27

## 2025-01-27 RX ADMIN — SODIUM CHLORIDE, PRESERVATIVE FREE 10 ML: 5 INJECTION INTRAVENOUS at 10:41

## 2025-01-27 RX ADMIN — DEXTROSE MONOHYDRATE 15 MG/HR: 50 INJECTION, SOLUTION INTRAVENOUS at 01:16

## 2025-01-27 RX ADMIN — Medication 1 TABLET: at 10:40

## 2025-01-27 RX ADMIN — CHOLECALCIFEROL TAB 25 MCG (1000 UNIT) 1000 UNITS: 25 TAB at 10:41

## 2025-01-27 RX ADMIN — OXYCODONE HYDROCHLORIDE AND ACETAMINOPHEN 500 MG: 500 TABLET ORAL at 10:41

## 2025-01-27 RX ADMIN — LEVOTHYROXINE SODIUM 88 MCG: 0.09 TABLET ORAL at 10:44

## 2025-01-27 RX ADMIN — APIXABAN 5 MG: 5 TABLET, FILM COATED ORAL at 10:41

## 2025-01-27 RX ADMIN — VITAM B12 100 MCG: 100 TAB at 10:41

## 2025-01-27 NOTE — PROGRESS NOTES
AVS reviewed with the patient and IV removed. Patient's  will be coming to pick patient up. Patient requested to walk herself outside and will let this RN know when spouse arrives.

## 2025-01-27 NOTE — CONSULTS
CARDIOLOGY INITIAL VIST    Karlene BYRD [place]  1965      Referring physician:   Jean Ring MD     Primary care physician:  Yuliya Delarosa MD    Reason for consultation: New onset atrial fibrillation with rapid ventricular rate response        History of present illness: 59-year-old female noticed presented with palpitations started after she prepared breakfast yesterday.  She reports she had a party on Saturday night had usual 2 alcohol drinks nothing out of the ordinary and yesterday woke up normally.  She had Jamaican toast with syrup for breakfast and started having palpitation.  Came to emergency room was noted to be in atrial fibrillation was given IV Cardizem and she converted to normal sinus rhythm and has been in normal sinus rhythm since then.  She has been otherwise healthy.  Denies any chest pains or any unusual stress.  Emergency room records reviewed.  In emergency room she reported substernal chest discomfort 30 to 40 minutes prior to arrival.  Has family history of father having a heart attack at young age.  She does not smoke does not have any hypertension diabetes.  She has hypothyroidism on thyroid supplementation.  She never had any cardiac arrhythmias.  Denies any history of syncope or near syncope.    REVIEW OF SYSTEMS: Negative for TIA CVA syncope near syncope or history of GI bleeding.    Past medical history:  has a past medical history of Anxiety, Breast mass, right, IBS (irritable bowel syndrome), Kidney stones, and Thyroid disease.    Past surgical history:  has a past surgical history that includes Neck surgery; Lithotripsy; Hysterectomy; and Breast surgery (Left).    Social History:  reports that she has never smoked. She has never used smokeless tobacco. She reports current alcohol use of about 2.0 standard drinks of alcohol per week. She reports that she does not use drugs.    Family history: family history includes Arthritis in her mother;

## 2025-01-27 NOTE — PLAN OF CARE
Problem: Respiratory - Adult  Goal: Achieves optimal ventilation and oxygenation  1/27/2025 1403 by Zahraa Perez RN  Outcome: Adequate for Discharge  1/27/2025 1048 by Reena Mccormack RN  Outcome: Adequate for Discharge  Flowsheets  Taken 1/27/2025 0402 by Bianca Beaulieu RN  Achieves optimal ventilation and oxygenation:   Assess for changes in respiratory status   Assess for changes in mentation and behavior   Position to facilitate oxygenation and minimize respiratory effort   Oxygen supplementation based on oxygen saturation or arterial blood gases   Encourage broncho-pulmonary hygiene including cough, deep breathe, incentive spirometry   Respiratory therapy support as indicated   Assess and instruct to report shortness of breath or any respiratory difficulty  Taken 1/27/2025 0030 by Bianca Beaulieu RN  Achieves optimal ventilation and oxygenation:   Assess for changes in respiratory status   Assess for changes in mentation and behavior   Position to facilitate oxygenation and minimize respiratory effort   Oxygen supplementation based on oxygen saturation or arterial blood gases   Encourage broncho-pulmonary hygiene including cough, deep breathe, incentive spirometry   Assess and instruct to report shortness of breath or any respiratory difficulty   Respiratory therapy support as indicated     Problem: Cardiovascular - Adult  Goal: Maintains optimal cardiac output and hemodynamic stability  1/27/2025 1403 by Zahraa Perez RN  Outcome: Adequate for Discharge  1/27/2025 1048 by Reena Mccormack RN  Outcome: Progressing  Flowsheets  Taken 1/27/2025 0402 by Bianca Beaulieu RN  Maintains optimal cardiac output and hemodynamic stability:   Monitor blood pressure and heart rate   Monitor urine output and notify Licensed Independent Practitioner for values outside of normal range   Assess for signs of decreased cardiac output   Administer vasoactive medications as ordered  Taken 1/27/2025 0030 by

## 2025-01-27 NOTE — PLAN OF CARE
Problem: Respiratory - Adult  Goal: Achieves optimal ventilation and oxygenation  1/26/2025 2243 by Bianca Beaulieu RN  Outcome: Progressing  1/26/2025 1656 by Reena Mccormack RN  Outcome: Progressing     Problem: Cardiovascular - Adult  Goal: Maintains optimal cardiac output and hemodynamic stability  1/26/2025 2243 by Bianca Beaulieu RN  Outcome: Progressing  1/26/2025 1656 by Reena Mccormack RN  Outcome: Progressing  Goal: Absence of cardiac dysrhythmias or at baseline  1/26/2025 2243 by Bianca Beaulieu RN  Outcome: Progressing  1/26/2025 1656 by Reena Mccormack RN  Outcome: Progressing     Problem: Metabolic/Fluid and Electrolytes - Adult  Goal: Electrolytes maintained within normal limits  1/26/2025 2243 by Bianca Beaulieu RN  Outcome: Progressing  1/26/2025 1656 by Reena Mccormack RN  Outcome: Progressing  Goal: Hemodynamic stability and optimal renal function maintained  1/26/2025 2243 by Bianca Beaulieu RN  Outcome: Progressing  1/26/2025 1656 by Reena Mccormack RN  Outcome: Progressing     Problem: Hematologic - Adult  Goal: Maintains hematologic stability  1/26/2025 2243 by Bianca Beaulieu RN  Outcome: Progressing  1/26/2025 1656 by Reena Mccormack RN  Outcome: Progressing     Problem: Chronic Conditions and Co-morbidities  Goal: Patient's chronic conditions and co-morbidity symptoms are monitored and maintained or improved  Outcome: Progressing     Problem: Pain  Goal: Verbalizes/displays adequate comfort level or baseline comfort level  Outcome: Progressing     Problem: Discharge Planning  Goal: Discharge to home or other facility with appropriate resources  Outcome: Progressing     Problem: Safety - Adult  Goal: Free from fall injury  Outcome: Progressing

## 2025-01-27 NOTE — CARE COORDINATION
CM reviewed the patient's chart to initiate discharge planning, patient transferred to [facility].  Patient is from home with her spouse, has medical coverage & PCP, and is independent with ADLs.  Patient does not require the use of any assistive devices or home oxygen.  Plan is for the patient to return home upon discharge and no needs have been identified at this time.  CM available if needs airs.

## 2025-01-27 NOTE — DISCHARGE SUMMARY
V2.0  Discharge Summary    Name:  Karlene Finnegan /Age/Sex: 1965 (59 y.o. female)   Admit Date: 2025  Discharge Date: 25    MRN & CSN:  [id number] & [id number] Encounter Date and Time 25 1:51 PM EST    Attending:  Jean Ring MD Discharging Provider: Peg Kerr APRN - CNP       Hospital Course:     Brief HPI: Karlene Finnegan is a 59 y.o. female who presented with new onset a-fib     Brief Problem Based Course:     New onset atrial for with RVR, patient initially started on Cardizem drip, and Eliquis.  She did convert to normal sinus rhythm overnight.  [id number] 1.  Echo completed, read still pending but after discussion with cardiology he reports the echo was nonacute.  Cardiology evaluated and recommends discharge home on low-dose aspirin with follow-up in office for 1 month event monitor.  Will start aspirin 81 mg p.o. daily on discharge.  Hypothyroidism, continue Synthroid 88 mcg daily on discharge, TSH checked in the emergency department and is 1.39.  History of kidney stones, no complaints of flank pain.  No urinary complaints      The patient expressed appropriate understanding of, and agreement with the discharge recommendations, medications, and plan.     Consults this admission:  IP CONSULT TO CARDIOLOGY    Discharge Diagnosis:   New onset a-fib (HCC)    Discharge Instruction:   Follow up appointments: Cardiology in 1 to 2 weeks  Primary care physician: Yuliya Delarosa MD within 2 weeks  Diet: regular diet   Activity: activity as tolerated  Disposition: Discharged to:   [x]Home, [][facility], []SNF, []Acute Rehab, []Hospice   Condition on discharge: Stable  Labs and Tests to be Followed up as an outpatient by PCP or Specialist: None    Discharge Medications:        Medication List        START taking these medications      aspirin 81 MG EC tablet  Take 1 tablet by mouth daily            CONTINUE taking these medications      albuterol sulfate  (90 Base) MCG/ACT

## 2025-01-27 NOTE — PLAN OF CARE
Problem: Respiratory - Adult  Goal: Achieves optimal ventilation and oxygenation  1/27/2025 1048 by Reena Mccormack RN  Outcome: Adequate for Discharge  Flowsheets  Taken 1/27/2025 0402 by Bianca Beaulieu RN  Achieves optimal ventilation and oxygenation:   Assess for changes in respiratory status   Assess for changes in mentation and behavior   Position to facilitate oxygenation and minimize respiratory effort   Oxygen supplementation based on oxygen saturation or arterial blood gases   Encourage broncho-pulmonary hygiene including cough, deep breathe, incentive spirometry   Respiratory therapy support as indicated   Assess and instruct to report shortness of breath or any respiratory difficulty  Taken 1/27/2025 0030 by Bianca Beaulieu RN  Achieves optimal ventilation and oxygenation:   Assess for changes in respiratory status   Assess for changes in mentation and behavior   Position to facilitate oxygenation and minimize respiratory effort   Oxygen supplementation based on oxygen saturation or arterial blood gases   Encourage broncho-pulmonary hygiene including cough, deep breathe, incentive spirometry   Assess and instruct to report shortness of breath or any respiratory difficulty   Respiratory therapy support as indicated  1/26/2025 2243 by Bianca Beaulieu RN  Outcome: Progressing  Flowsheets (Taken 1/26/2025 2015)  Achieves optimal ventilation and oxygenation:   Assess for changes in respiratory status   Assess for changes in mentation and behavior   Position to facilitate oxygenation and minimize respiratory effort   Oxygen supplementation based on oxygen saturation or arterial blood gases   Encourage broncho-pulmonary hygiene including cough, deep breathe, incentive spirometry   Respiratory therapy support as indicated   Assess and instruct to report shortness of breath or any respiratory difficulty     Problem: Cardiovascular - Adult  Goal: Maintains optimal cardiac output and hemodynamic

## 2025-01-28 ENCOUNTER — CARE COORDINATION (OUTPATIENT)
Dept: CASE MANAGEMENT | Age: 60
End: 2025-01-28

## 2025-01-28 ENCOUNTER — TELEPHONE (OUTPATIENT)
Dept: CARDIOLOGY CLINIC | Age: 60
End: 2025-01-28

## 2025-01-28 DIAGNOSIS — I48.91 NEW ONSET A-FIB (HCC): Primary | ICD-10-CM

## 2025-01-28 NOTE — CARE COORDINATION
Care Transitions Note    Initial Call - Call within 2 business days of discharge: Yes    Patient Current Location:  Home: [address] [place] [address]    Care Transition Nurse contacted patient by telephone to perform post hospital discharge assessment, verified name and  as identifiers. Provided introduction to self, and explanation of Care Transition Nurse role.     Patient: Karlene Finnegan    Patient : 1965   MRN: [id number]    Reason for Admission: New A Fib w/ RVR   Discharge Date: 25  RURS: Readmission Risk Score: 3.6  Facility: [facility] 25-25    Last Discharge Facility       Date Complaint Diagnosis Description Type Department Provider    25 Dizziness New onset a-fib (HCC) ... ED to Hosp-Admission (Discharged) (ADMITTED) Jean Pearce MD; Feliciano Lema, DO     Was this an external facility discharge? No    Additional needs identified to be addressed with provider   Patient: Karlene Finnegan    Patient : 1965   MRN: [id number]    Reason for Admission: New A Fib w/ RVR   Facility: [facility] 25-25    Please contact for scheduling of 7 day hospital follow up/ TCM appt due by 2/3/25.  Patient has been unable to reach office (phone issue)         Method of communication with provider: chart routing [facility] clinical pool    Patients top risk factors for readmission: medical condition-new afib    Interventions to address risk factors:   Review of patient management of conditions/medications: AVS    Care Summary Note: Patient reports doing well since home, just fatigued. Denies chest pain, palpitations, sob, dizziness, diaphoresis, n/v, ac distress. Noted to be speaking in complete, unlabored sentences. Reports /78, HR 65. Reports taking Asa as directed. Reports she has been unable to reach cardiology office for appt--office phone issue; CTN routed note to [facility] as above. Patient is non-smoker. Advised heart healthy diet. Advised avoiding

## 2025-01-28 NOTE — TELEPHONE ENCOUNTER
Spoke with negro. She stated she already spoke with someone and has a OV on 2/11/25. Does not want a sooner OV at this time

## 2025-01-30 ENCOUNTER — CARE COORDINATION (OUTPATIENT)
Dept: CASE MANAGEMENT | Age: 60
End: 2025-01-30

## 2025-01-30 NOTE — CARE COORDINATION
Care Transitions Note    Follow Up Call     Patient: Karlene Finnegan                                   Patient : 1965   MRN: [id number]                             Reason for Admission: New A Fib w/ RVR   Discharge Date: 25       RURS: Readmission Risk Score: 3.6  Facility: [facility] 25-25    Patient Current Location:  [place]    Care Transition Nurse contacted patient by telephone. Verified name and  as identifiers.    Additional needs identified to be addressed with provider   No needs identified       Method of communication with provider: none.    Care Summary Note: Patient reports doing well, currently at work. Denies chest pain, palpitations, sob, dizziness, diaphoresis, n/v, ac distress. Noted to be speaking in complete, unlabored sentences. Reports /78, HR 62. Reports taking Asa as directed. Has cardiology appt 25 w/ Dr NERI Pavon. Reminded to adhere to heart healthy diet avoiding caffeine and stimulants. Reports appetite, fluid intake good w/ b&b wnl. Denies resource needs.     Plan of care updates since last contact:  Review of patient management of conditions/medications: AVS     Medication Review:  No changes since last call.     Remote Patient Monitoring:  No qualifying dx.     Assessments:  Care Transitions Subsequent and Final Call    Subsequent and Final Calls  Do you have any ongoing symptoms?: No  Have your medications changed?: No  Do you have any questions related to your medications?: No  Do you currently have any active services?: No  Do you have any needs or concerns that I can assist you with?: No  Identified Barriers: Other  Care Transitions Interventions   Home Care Waiver: Declined        Transportation Support: Declined    Meals on Wheels: Declined  DME Assistance: Declined     Senior Services: Declined    Other Interventions:              Follow Up Appointment:   Reviewed upcoming appointment(s).  Future Appointments         Provider Specialty Dept Phone

## 2025-02-11 ENCOUNTER — OFFICE VISIT (OUTPATIENT)
Dept: FAMILY MEDICINE CLINIC | Age: 60
End: 2025-02-11
Payer: COMMERCIAL

## 2025-02-11 ENCOUNTER — OFFICE VISIT (OUTPATIENT)
Dept: CARDIOLOGY CLINIC | Age: 60
End: 2025-02-11
Payer: COMMERCIAL

## 2025-02-11 ENCOUNTER — TELEPHONE (OUTPATIENT)
Dept: FAMILY MEDICINE CLINIC | Age: 60
End: 2025-02-11

## 2025-02-11 VITALS
OXYGEN SATURATION: 98 % | BODY MASS INDEX: 25.06 KG/M2 | HEART RATE: 80 BPM | SYSTOLIC BLOOD PRESSURE: 122 MMHG | WEIGHT: 160 LBS | DIASTOLIC BLOOD PRESSURE: 80 MMHG

## 2025-02-11 VITALS
WEIGHT: 160 LBS | HEART RATE: 58 BPM | SYSTOLIC BLOOD PRESSURE: 128 MMHG | HEIGHT: 67 IN | DIASTOLIC BLOOD PRESSURE: 80 MMHG | BODY MASS INDEX: 25.11 KG/M2

## 2025-02-11 DIAGNOSIS — J01.90 ACUTE BACTERIAL SINUSITIS: ICD-10-CM

## 2025-02-11 DIAGNOSIS — B96.89 ACUTE BACTERIAL SINUSITIS: ICD-10-CM

## 2025-02-11 DIAGNOSIS — R05.1 ACUTE COUGH: ICD-10-CM

## 2025-02-11 DIAGNOSIS — R10.13 DYSPEPSIA: ICD-10-CM

## 2025-02-11 DIAGNOSIS — Z20.828 EXPOSURE TO INFLUENZA: Primary | ICD-10-CM

## 2025-02-11 DIAGNOSIS — I48.0 PAF (PAROXYSMAL ATRIAL FIBRILLATION) (HCC): Primary | ICD-10-CM

## 2025-02-11 DIAGNOSIS — I48.0 PAF (PAROXYSMAL ATRIAL FIBRILLATION) (HCC): ICD-10-CM

## 2025-02-11 DIAGNOSIS — R51.9 ACUTE INTRACTABLE HEADACHE, UNSPECIFIED HEADACHE TYPE: ICD-10-CM

## 2025-02-11 PROCEDURE — 99213 OFFICE O/P EST LOW 20 MIN: CPT | Performed by: INTERNAL MEDICINE

## 2025-02-11 PROCEDURE — 99214 OFFICE O/P EST MOD 30 MIN: CPT | Performed by: FAMILY MEDICINE

## 2025-02-11 PROCEDURE — 93000 ELECTROCARDIOGRAM COMPLETE: CPT | Performed by: INTERNAL MEDICINE

## 2025-02-11 RX ORDER — FAMOTIDINE 20 MG/1
20 TABLET, FILM COATED ORAL 2 TIMES DAILY
Qty: 60 TABLET | Refills: 3 | Status: SHIPPED | OUTPATIENT
Start: 2025-02-11

## 2025-02-11 RX ORDER — DOXYCYCLINE HYCLATE 100 MG
100 TABLET ORAL 2 TIMES DAILY
Qty: 60 TABLET | Refills: 0 | Status: SHIPPED | OUTPATIENT
Start: 2025-02-11 | End: 2025-03-13

## 2025-02-11 ASSESSMENT — PATIENT HEALTH QUESTIONNAIRE - PHQ9
SUM OF ALL RESPONSES TO PHQ QUESTIONS 1-9: 0
SUM OF ALL RESPONSES TO PHQ QUESTIONS 1-9: 0
SUM OF ALL RESPONSES TO PHQ9 QUESTIONS 1 & 2: 0
2. FEELING DOWN, DEPRESSED OR HOPELESS: NOT AT ALL
1. LITTLE INTEREST OR PLEASURE IN DOING THINGS: NOT AT ALL
SUM OF ALL RESPONSES TO PHQ QUESTIONS 1-9: 0
SUM OF ALL RESPONSES TO PHQ QUESTIONS 1-9: 0

## 2025-02-11 ASSESSMENT — ENCOUNTER SYMPTOMS
SINUS PRESSURE: 1
SORE THROAT: 1
SHORTNESS OF BREATH: 1
CHEST TIGHTNESS: 0
SINUS PAIN: 1
WHEEZING: 0
COUGH: 1
BACK PAIN: 0
RHINORRHEA: 1
TROUBLE SWALLOWING: 1
ABDOMINAL PAIN: 0

## 2025-02-11 NOTE — TELEPHONE ENCOUNTER
Pt stated she will needs a RTW Note for tomorrow. She feels she won't be able to work with her continuous cough but will return to work on Thursday. Please advise

## 2025-02-11 NOTE — ASSESSMENT & PLAN NOTE
Patient had an isolated symptomatic atrial fibrillation with rapid ventricular response spontaneously converted and had not had much recurrence.  Chads vascular score is 1.  The patient is not tolerating low-dose aspirin.  She can stop aspirin and see if her symptoms of indigestion and ringing in the ear improved.  We will consider restarting it only if she has more recurrence.  She is counseled to stay away from alcohol and caffeine and chocolate as best as possible.  We will check her magnesium which was low on her last admission and if her magnesium stays below 2 she may need long-term magnesium supplementation.  TSH and T4 were normal.  She can follow-up with PCP and see me as needed or once a year.

## 2025-02-11 NOTE — PROGRESS NOTES
Karlene Finnegan  1965  Yuliya Delarosa MD      Chief Complaint   Patient presents with    Follow-Up from Hospital     Follow up from Hospital for new onset AFIB  No chest pain, SOB dizziness, swelling or palpitations  Currently has a respiratory virus     Atrial Fibrillation     Chief complaint and HPI:  Karlene Finnegan  is a 59 y.o. female following up after an isolated episode of atrial fibrillation she had on 1/26/2025 on the weekend and had converted normal sinus rhythm.  She had not had any palpitations since then.  She stated that she will no if she is having those arrhythmias because she was symptomatic.  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  Does not drink too much caffeine or chocolate.  She was drinking alcohol on the weekend the Saturday night with friends but level was okay.  She her magnesium level was low thyroid level was normal and she is on thyroid supplementation.  She reports she feels indigestion and wants to know if it is due to aspirin and she also reporting some ringing in the ear.  She had course of upper respite infection and wearing facemask.  Rest of the Cardiovascular system review is otherwise unchanged from prior encounter.  Past medical history:  has a past medical history of Anxiety, Breast mass, right, IBS (irritable bowel syndrome), Kidney stones, and Thyroid disease.  Past surgical history:  has a past surgical history that includes Neck surgery; Lithotripsy; Hysterectomy; and Breast surgery (Left).  Social History:   Social History     Tobacco Use    Smoking status: Never    Smokeless tobacco: Never   Substance Use Topics    Alcohol use: Yes     Alcohol/week: 2.0 standard drinks of alcohol     Types: 2 Glasses of wine per week     Comment: rarely     Family history: family history includes Arthritis in her mother; Breast Cancer in her mother; Early Death in her father; Heart Attack in her father; High Blood Pressure in her father and mother; High

## 2025-02-11 NOTE — PROGRESS NOTES
Cervical back: Normal range of motion and neck supple.   Lymphadenopathy:      Cervical: No cervical adenopathy.   Skin:     General: Skin is warm and dry.      Findings: No erythema or rash.   Neurological:      Mental Status: She is alert and oriented to person, place, and time.         Results  Laboratory Studies  Influenza test is negative/trace positive.    Hospital discharge information reviewed.  Patient is to keep follow-up with cardiology this afternoon to help decide about blood thinning medications.  _________________________________________________  Assessment:     1. Exposure to influenza  2. Acute cough  -     doxycycline hyclate (VIBRA-TABS) 100 MG tablet; Take 1 tablet by mouth 2 times daily, Disp-60 tablet, R-0Normal  3. Dyspepsia  -     famotidine (PEPCID) 20 MG tablet; Take 1 tablet by mouth 2 times daily, Disp-60 tablet, R-3Normal  4. PAF (paroxysmal atrial fibrillation) ([facility])  5. Acute bacterial sinusitis  6. Acute intractable headache, unspecified headache type    Problems listed above are stable and therapeutic plan is unchanged unless otherwise specified.      Assessment & Plan  Cough.  Her symptoms suggest a possible case of sinusitis. The absence of wheezing during the examination indicates that steroids are not necessary at this point. A prescription for doxycycline will be provided. If her condition does not improve by Thursday or Friday, she is advised to contact the office to discuss the potential addition of steroids to her treatment plan.    Atrial Fibrillation.  She is currently not experiencing atrial fibrillation and remains in sinus rhythm. The decision regarding the continuation of aspirin therapy will be deferred to her cardiologist.    Epigastric Discomfort.  A prescription for Pepcid will be provided, to be taken twice daily throughout the month. This medication is intended to protect her stomach while she continues her aspirin regimen.    See orders and comments above for

## 2025-02-12 ENCOUNTER — CARE COORDINATION (OUTPATIENT)
Dept: CARE COORDINATION | Age: 60
End: 2025-02-12

## 2025-02-12 NOTE — CARE COORDINATION
Care Transitions Note    Follow Up Call     Patient Current Location:  Home: [address] [facility] [address]    Care Transition Nurse contacted the patient by telephone. Verified name and  as identifiers.    Additional needs identified to be addressed with provider   No needs identified                 Method of communication with provider: none.    Care Summary Note: States she's had the flu for over a week. Was started on antibiotic yesterday by PCP. Reports fevers and productive cough with light green phlegm. Denies SOB, CP, palpitations, lightheadedness, or dizziness. Completed cardiology f/u yesterday. Discussed when to call doctor for new/worsening symptoms. Denies questions or concerns at this time.   Plan of care updates since last contact:  Review of patient management of conditions/medications:         Advance Care Planning:   Does patient have an Advance Directive: not on file; education provided -   .    Medication Review:  Medications changed since last call, reviewed today.     Remote Patient Monitoring:  Offered patient enrollment in the Remote Patient Monitoring (RPM) program for in-home monitoring: Patient is not eligible for RPM program because: patient does not have qualifying diagnosis.    Assessments:  Care Transitions Subsequent and Final Call    Subsequent and Final Calls  Care Transitions Interventions  Other Interventions:              Follow Up Appointment:   Reviewed upcoming appointment(s).  Future Appointments         Provider Specialty Dept Phone    3/25/2025 8:40 AM Yuliya Delarosa MD Family Medicine [phone]    2025 1:30 PM Dada Fernandez,  Neurosurgery [phone]    2026 9:20 AM Yandel Pavon MD Cardiology [phone]            Care Transition Nurse provided contact information.  Plan for follow-up call in 2-5 days based on severity of symptoms and risk factors.  Plan for next call: symptom management-   self management-       Danni Tobin

## 2025-02-14 ENCOUNTER — PATIENT MESSAGE (OUTPATIENT)
Dept: FAMILY MEDICINE CLINIC | Age: 60
End: 2025-02-14

## 2025-02-14 RX ORDER — METHYLPREDNISOLONE 4 MG/1
TABLET ORAL
Qty: 1 KIT | Refills: 0 | Status: SHIPPED | OUTPATIENT
Start: 2025-02-14

## 2025-02-17 ENCOUNTER — CARE COORDINATION (OUTPATIENT)
Dept: CASE MANAGEMENT | Age: 60
End: 2025-02-17

## 2025-02-17 NOTE — CARE COORDINATION
Care Transitions Note    Follow Up Call     Patient: Karlene Finnegan                                   Patient : 1965   MRN: [id number]                             Reason for Admission: New A Fib w/ RVR   Discharge Date: 25       RURS: Readmission Risk Score: 3.6  Facility: [facility] 25-25       Patient Current Location:  Home: [address] [place] [address] Care Coordinator contacted the patient by telephone. Verified name and  as identifiers.    Additional needs identified to be addressed with provider   No needs identified                 Method of communication with provider: none.    Care Summary Note:   Spoke with patient. She stated she was feeling fine. Denies cp, palpitations, sob, fever, chills, ha or dizziness. Has a dry cough. Adequate dietary and fluid intake. Takes medication as prescribed. No needs or concerns voiced.    Plan of care updates since last contact:  Review of patient management of conditions/medications:         Advance Care Planning:   Does patient have an Advance Directive: not on file.    Medication Review:  No changes since last call.     Remote Patient Monitoring:  Offered patient enrollment in the Remote Patient Monitoring (RPM) program for in-home monitoring: Patient is not eligible for RPM program because: patient does not have qualifying diagnosis.    Assessments:  Care Transitions Subsequent and Final Call    Subsequent and Final Calls  Care Transitions Interventions   Home Care Waiver: Declined        Transportation Support: Declined    Meals on Wheels: Declined  DME Assistance: Declined     Senior Services: Declined    Other Interventions:              Follow Up Appointment:   CHEIKH appointment attended as scheduled   Future Appointments         Provider Specialty Dept Phone    3/25/2025 8:40 AM Yuliya Delarosa MD Family Medicine [phone]    2025 1:30 PM Dada Fernandez DO Neurosurgery [phone]    2026 9:20 AM [place]

## 2025-02-25 DIAGNOSIS — E03.9 ACQUIRED HYPOTHYROIDISM: ICD-10-CM

## 2025-02-25 RX ORDER — LEVOTHYROXINE SODIUM 88 UG/1
TABLET ORAL
Qty: 90 TABLET | Refills: 1 | Status: SHIPPED | OUTPATIENT
Start: 2025-02-25

## 2025-02-26 ENCOUNTER — CARE COORDINATION (OUTPATIENT)
Dept: CASE MANAGEMENT | Age: 60
End: 2025-02-26

## 2025-02-26 NOTE — CARE COORDINATION
Care Transitions Note    Follow Up Call     Patient: Karlene Finnegan                                   Patient : 1965   MRN: [id number]                             Reason for Admission: New A Fib w/ RVR   Discharge Date: 25       RURS: Readmission Risk Score: 3.6  Facility: [facility] 25-25    Attempt to reach for Care Transition follow up call unsuccessful as no answer, no vm option.    Outreach Attempts:   Unable to leave message.     Follow Up Appointment:   Future Appointments         Provider Specialty Dept Phone    3/25/2025 8:40 AM Yuliya Delarosa MD Family Medicine [phone]    2025 1:30 PM Dada Fernandez DO Neurosurgery [phone]    2026 9:20 AM Yandel Pavon MD Cardiology [phone]            Plan for follow-up on next business day.  based on severity of symptoms and risk factors. Plan for next call: final call    Radha Carmen RN

## 2025-02-27 ENCOUNTER — CARE COORDINATION (OUTPATIENT)
Dept: CASE MANAGEMENT | Age: 60
End: 2025-02-27

## 2025-02-27 LAB — MAGNESIUM: 2.2 MG/DL (ref 1.4–2.5)

## 2025-02-27 NOTE — CARE COORDINATION
Care Transitions Note    Follow Up Call     Patient: Karlene Finnegan                                   Patient : 1965   MRN: [id number]                             Reason for Admission: New A Fib w/ RVR   Discharge Date: 25       RURS: Readmission Risk Score: 3.6  Facility: [facility] 25-25     2nd unsuccessful attempt to patient reach for Care Transition follow up call. HIPAA compliant message left requesting call back. CT program closed per protocol, no further outreach scheduled.     Follow Up Appointment:   Future Appointments         Provider Specialty Dept Phone    3/25/2025 8:40 AM Yuliya Delarosa MD Family Medicine [phone]    2025 1:30 PM Dada Fernandez DO Neurosurgery [phone]    2026 9:20 AM Yandel Pavon MD Cardiology [phone]            Radha Carmen RN

## 2025-03-07 ENCOUNTER — TELEPHONE (OUTPATIENT)
Dept: CARDIOLOGY CLINIC | Age: 60
End: 2025-03-07

## 2025-03-07 DIAGNOSIS — I48.0 PAF (PAROXYSMAL ATRIAL FIBRILLATION) (HCC): Primary | ICD-10-CM

## 2025-03-07 NOTE — TELEPHONE ENCOUNTER
Pt called and is at [facility] for A-fib. She is on Cardizem drip while waiting for labs and xray to come back. She states they will probably keep her over night.

## 2025-03-10 NOTE — TELEPHONE ENCOUNTER
Referral  for Dr Carter placed. Patient is aware.      Patient asked to cancel her OV tomorrow with Dr Pavon, she stated she just saw a heart  Saturday

## 2025-03-12 ENCOUNTER — TELEPHONE (OUTPATIENT)
Dept: CARDIOLOGY CLINIC | Age: 60
End: 2025-03-12

## 2025-03-12 NOTE — TELEPHONE ENCOUNTER
Pt called to get an appt with Dr. Carter. Dr. Pavon sent referral for A-fib. 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 She gets off at 330pm and is off on Monday's and Tuesday's. If you want   you can schedule her for this times and leave her message.

## 2025-03-12 NOTE — TELEPHONE ENCOUNTER
Called patient, no ans lm for patient to call back. Advised Dr Emma price not see patients on Mondays or Tuesdays and to call me back so we can schedule her apt.

## 2025-03-26 DIAGNOSIS — R92.343 EXTREMELY DENSE TISSUE OF BOTH BREASTS ON MAMMOGRAPHY: ICD-10-CM

## 2025-03-28 ENCOUNTER — INITIAL CONSULT (OUTPATIENT)
Age: 60
End: 2025-03-28
Payer: COMMERCIAL

## 2025-03-28 VITALS
BODY MASS INDEX: 26.18 KG/M2 | WEIGHT: 166.8 LBS | SYSTOLIC BLOOD PRESSURE: 114 MMHG | HEART RATE: 88 BPM | DIASTOLIC BLOOD PRESSURE: 74 MMHG | HEIGHT: 67 IN

## 2025-03-28 DIAGNOSIS — R07.9 CHEST PAIN, UNSPECIFIED TYPE: ICD-10-CM

## 2025-03-28 DIAGNOSIS — I48.0 PAROXYSMAL ATRIAL FIBRILLATION (HCC): ICD-10-CM

## 2025-03-28 DIAGNOSIS — I48.0 PAF (PAROXYSMAL ATRIAL FIBRILLATION) (HCC): Primary | ICD-10-CM

## 2025-03-28 PROCEDURE — 93000 ELECTROCARDIOGRAM COMPLETE: CPT | Performed by: INTERNAL MEDICINE

## 2025-03-28 PROCEDURE — 99204 OFFICE O/P NEW MOD 45 MIN: CPT | Performed by: INTERNAL MEDICINE

## 2025-03-28 NOTE — PROGRESS NOTES
congestion.      Mouth/Throat:      Mouth: Mucous membranes are moist.   Eyes:      Extraocular Movements: Extraocular movements intact.      Conjunctiva/sclera: Conjunctivae normal.      Pupils: Pupils are equal, round, and reactive to light.   Cardiovascular:      Rate and Rhythm: Normal rate and regular rhythm.      Heart sounds: No murmur heard.  Pulmonary:      Effort: Pulmonary effort is normal. No respiratory distress.      Breath sounds: Normal breath sounds. No wheezing or rhonchi.   Abdominal:      General: Abdomen is flat. Bowel sounds are normal. There is no distension.      Palpations: Abdomen is soft.      Tenderness: There is no abdominal tenderness.   Musculoskeletal:         General: No tenderness.      Cervical back: Normal range of motion. No tenderness.      Right lower leg: No edema.      Left lower leg: No edema.   Skin:     General: Skin is warm.   Neurological:      General: No focal deficit present.      Mental Status: She is alert and oriented to person, place, and time.      Cranial Nerves: No cranial nerve deficit.   Psychiatric:         Mood and Affect: Mood normal.               CBC:   Lab Results   Component Value Date/Time    WBC 5.1 03/08/2025 08:50 AM    HGB 14.8 03/08/2025 08:50 AM    HCT 43.6 03/08/2025 08:50 AM     03/08/2025 08:50 AM     Lipids:  Lab Results   Component Value Date    CHOL 184 05/31/2022    TRIG 157 (H) 05/31/2022    HDL 64 (H) 10/30/2023    LDLDIRECT 65 10/25/2017     PT/INR:   Lab Results   Component Value Date/Time    INR 1.3 01/27/2025 05:13 AM        BMP:    Lab Results   Component Value Date     03/08/2025    K 3.6 03/08/2025     03/08/2025    CO2 25 03/08/2025    BUN 16 03/08/2025     CMP:   Lab Results   Component Value Date    AST 20 01/26/2025    ALT 16 01/26/2025    BILITOT 0.6 01/26/2025    ALKPHOS 92 01/26/2025     TSH:    Lab Results   Component Value Date/Time    TSH 1.39 01/26/2025 11:35 AM    TSH 2.73 04/25/2018 08:16 AM

## 2025-04-08 ENCOUNTER — OFFICE VISIT (OUTPATIENT)
Dept: FAMILY MEDICINE CLINIC | Age: 60
End: 2025-04-08

## 2025-04-08 VITALS
WEIGHT: 163 LBS | DIASTOLIC BLOOD PRESSURE: 76 MMHG | SYSTOLIC BLOOD PRESSURE: 112 MMHG | HEART RATE: 63 BPM | BODY MASS INDEX: 25.53 KG/M2 | OXYGEN SATURATION: 97 %

## 2025-04-08 DIAGNOSIS — G47.00 INSOMNIA, UNSPECIFIED TYPE: ICD-10-CM

## 2025-04-08 DIAGNOSIS — Z23 NEED FOR CHICKENPOX VACCINATION: ICD-10-CM

## 2025-04-08 DIAGNOSIS — Z12.31 ENCOUNTER FOR SCREENING MAMMOGRAM FOR MALIGNANT NEOPLASM OF BREAST: ICD-10-CM

## 2025-04-08 DIAGNOSIS — J02.9 ACUTE PHARYNGITIS, UNSPECIFIED ETIOLOGY: Primary | ICD-10-CM

## 2025-04-08 DIAGNOSIS — F41.1 GAD (GENERALIZED ANXIETY DISORDER): ICD-10-CM

## 2025-04-08 DIAGNOSIS — Z23 NEED FOR PNEUMOCOCCAL VACCINATION: ICD-10-CM

## 2025-04-08 DIAGNOSIS — Z80.3 FAMILY HISTORY OF BREAST CANCER: ICD-10-CM

## 2025-04-08 DIAGNOSIS — I48.0 PAF (PAROXYSMAL ATRIAL FIBRILLATION) (HCC): ICD-10-CM

## 2025-04-08 DIAGNOSIS — Z29.11 NEED FOR RSV IMMUNIZATION: ICD-10-CM

## 2025-04-08 DIAGNOSIS — E03.9 ACQUIRED HYPOTHYROIDISM: ICD-10-CM

## 2025-04-08 DIAGNOSIS — R10.13 DYSPEPSIA: ICD-10-CM

## 2025-04-08 RX ORDER — ZOSTER VACCINE RECOMBINANT, ADJUVANTED 50 MCG/0.5
0.5 KIT INTRAMUSCULAR SEE ADMIN INSTRUCTIONS
Qty: 0.5 ML | Refills: 0 | Status: CANCELLED | OUTPATIENT
Start: 2025-04-08 | End: 2025-10-05

## 2025-04-08 NOTE — PROGRESS NOTES
Chief Complaint   Patient presents with    6 Month Follow-Up    Cold Symptoms     Sore throat mainly    Atrial Fibrillation     Had er visit last month after a fib episode, now Dr. Carter has her wearing a heart monitor    Insomnia     Sleep study later this month, [facility]    Heartburn     Woke up with it this morning    Neck Pain     Neck pain with arthritis -- awaiting rec for surgery Dr. Fernandez    Anxiety     Previously on xanax, declines referral to counsellor or any other medication (does not want xanax back)       [place] NARRATIVE:    History of Present Illness  The patient presents for a 6-month recheck. She has a history of hypothyroidism, reactive airway disease, and vitamin B deficiency. Multiple health maintenance interventions have been recommended.    A sore throat was reported upon awakening during the night, accompanied by postnasal drainage but no cough. She is uncertain if these symptoms are indicative of an impending illness or an allergic reaction to the current pollen season. Temperature has not been monitored at home.    A mammogram was performed in 03/2025 at [facility], revealing dense breast tissue similar to the previous year. An MRI was suggested, and she plans to schedule this for 09/2025.    A diagnosis of atrial fibrillation has been made, and she is under the care of a cardiologist, Dr. Pavon, and a specialist, Dr. Washington. An episode of atrial fibrillation occurred on 03/07/2025 and 03/08/2025, necessitating hospitalization. Currently, she is wearing a heart monitor, which has been used for approximately 2 weeks and will continue for another week. Reports indicate a low pulse rate and blood pressure, for which no medication was prescribed. Lightheadedness and anxiety related to the cardiac condition are reported. Additionally, neck issues, left arm pain, and indigestion are attributed to the cardiac condition. Occasional tinnitus is suspected to be a side effect of the baby

## 2025-04-10 ENCOUNTER — TELEPHONE (OUTPATIENT)
Dept: NEUROSURGERY | Age: 60
End: 2025-04-10

## 2025-04-10 NOTE — TELEPHONE ENCOUNTER
Called Patient to see if she has had imaging  at another facility and to see if she has had Physical Therapy at another facility if not we need to reschedule her. Patient returned call and stated she had a disc of her x-ray and did Physical therapy at [facility] ,but when I called they said she came for the first visit and did not return. She stated she has done therapy there for a long time. She stated she would call them and get the notes.

## 2025-04-11 ENCOUNTER — OFFICE VISIT (OUTPATIENT)
Dept: NEUROSURGERY | Age: 60
End: 2025-04-11
Payer: COMMERCIAL

## 2025-04-11 VITALS
DIASTOLIC BLOOD PRESSURE: 82 MMHG | HEART RATE: 63 BPM | SYSTOLIC BLOOD PRESSURE: 118 MMHG | OXYGEN SATURATION: 98 % | WEIGHT: 166 LBS | BODY MASS INDEX: 26 KG/M2

## 2025-04-11 DIAGNOSIS — M47.16 LUMBAR SPONDYLOSIS WITH MYELOPATHY: ICD-10-CM

## 2025-04-11 DIAGNOSIS — M54.12 CERVICAL RADICULOPATHY AT C6: Primary | ICD-10-CM

## 2025-04-11 DIAGNOSIS — M54.12 CERVICAL RADICULOPATHY AT C7: ICD-10-CM

## 2025-04-11 PROCEDURE — 99214 OFFICE O/P EST MOD 30 MIN: CPT | Performed by: NEUROLOGICAL SURGERY

## 2025-04-11 NOTE — PROGRESS NOTES
Follow up Office Visit: 2025   Patient: Karlene Finnegan   MRN: [id number]  : 1965   Neurosurgeon: Dada Fernandez DO  Referring: No ref. provider found  Primary: Yuliya Delarosa MD    INTERVAL HISTORY  Since her previous visit, Karlene is here to follow upon imaging. Patient stated therapy worked [facility].      PAST MEDICAL HISTORY:   Past Medical History:   Diagnosis Date    Anxiety     Breast mass, right 2017    IBS (irritable bowel syndrome)     Kidney stones     Thyroid disease        SURGICAL HISTORY:   Past Surgical History:   Procedure Laterality Date    BREAST SURGERY Left     MASS REMOVAL     HYSTERECTOMY (CERVIX STATUS UNKNOWN)      Right ovary and fall. tube removed.    LITHOTRIPSY      NECK SURGERY      swollen gland removed from right neck       FAMILY HISTORY:   Family History   Problem Relation Age of Onset    Arthritis Mother     Breast Cancer Mother     High Blood Pressure Mother     High Cholesterol Mother     Early Death Father     Heart Attack Father     High Blood Pressure Father     High Cholesterol Father        SOCIAL HISTORY:   Social History     Socioeconomic History    Marital status:      Spouse name: Alessandro    Number of children: Not on file    Years of education: Not on file    Highest education level: Not on file   Occupational History    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

## 2025-04-15 ENCOUNTER — HOSPITAL ENCOUNTER (OUTPATIENT)
Dept: GENERAL RADIOLOGY | Age: 60
Discharge: HOME | End: 2025-04-15
Attending: NEUROLOGICAL SURGERY

## 2025-04-15 DIAGNOSIS — Z00.6 EXAMINATION FOR NORMAL COMPARISON OR CONTROL IN CLINICAL RESEARCH: ICD-10-CM

## 2025-04-22 ASSESSMENT — ENCOUNTER SYMPTOMS
COUGH: 0
EYE PAIN: 0
COLOR CHANGE: 0
WHEEZING: 0
BLOOD IN STOOL: 0
BACK PAIN: 0
NAUSEA: 0
SHORTNESS OF BREATH: 1
CHEST TIGHTNESS: 0
ABDOMINAL PAIN: 0
VOMITING: 0
DIARRHEA: 0
PHOTOPHOBIA: 0

## 2025-04-23 NOTE — PATIENT INSTRUCTIONS
Forms/Letter Request    Type of form/letter: OTHER: [employer] Exam Report       Do we have the form/letter: Yes: Writer put it in PCP's blue folder    Who is the form from? Patient    Where did/will the form come from? Patient or family brought in       When is form/letter needed by: ASAP    How would you like the form/letter returned:     Patient Notified form requests are processed in 5-7 business days:Yes    Could we send this information to you in [employer] or would you prefer to receive a phone call?:   Patient would prefer a phone call   Okay to leave a detailed message?: Yes at Cell number on file:    Telephone Information:   Mobile [phone]       May hold low dose aspirin. Check serum magnesium if low needs replacement therapy. Avoid excessive alcohol, caffeine or chocolate.   Appropriate prescriptions if needed on this visit are addressed. After visit summery is provided.  Questions answered and patient verbalizes understanding. Follow up in 12 months,  sooner if needed.

## 2025-05-30 ENCOUNTER — OFFICE VISIT (OUTPATIENT)
Age: 60
End: 2025-05-30
Payer: COMMERCIAL

## 2025-05-30 VITALS
HEART RATE: 57 BPM | SYSTOLIC BLOOD PRESSURE: 118 MMHG | WEIGHT: 168.8 LBS | HEIGHT: 67 IN | BODY MASS INDEX: 26.49 KG/M2 | DIASTOLIC BLOOD PRESSURE: 72 MMHG

## 2025-05-30 DIAGNOSIS — I48.0 PAF (PAROXYSMAL ATRIAL FIBRILLATION) (HCC): Primary | ICD-10-CM

## 2025-05-30 PROCEDURE — 93000 ELECTROCARDIOGRAM COMPLETE: CPT | Performed by: NURSE PRACTITIONER

## 2025-05-30 PROCEDURE — 99213 OFFICE O/P EST LOW 20 MIN: CPT | Performed by: NURSE PRACTITIONER

## 2025-05-30 ASSESSMENT — ENCOUNTER SYMPTOMS
BLOOD IN STOOL: 0
COUGH: 0
ABDOMINAL DISTENTION: 0
SINUS PRESSURE: 0
SINUS PAIN: 0
ABDOMINAL PAIN: 0
COLOR CHANGE: 0
SHORTNESS OF BREATH: 0
BACK PAIN: 0
PHOTOPHOBIA: 0

## 2025-05-30 NOTE — PROGRESS NOTES
Electrophysiology Previous Note      Reason for consultation:  Atrial fibrillation    Chief complaint : follow up on monitor    Referring physician:       Primary care physician: Yuliya Delarosa MD      History of Present Illness:     Previous visit 5/30/2025  Patient is here today for follow-up on recent monitor.  Patient reports she has been feeling well.  She denies chest pain, palpitations, shortness of breath, lightheadedness, dizziness, edema or syncope.  Patient denies tobacco and caffeine.  Patient drinks alcohol occasionally.  Patient denies exercise  History of Present Illness  The patient presents for evaluation of atrial fibrillation.    She was previously evaluated by Dr. Pavon in 02/2025, during which she experienced an episode of atrial fibrillation. A second episode occurred on 03/07/2025, prompting her to seek medical attention at [facility]. The episode began suddenly while she was shopping with her daughter, describing her heart as \"just started what you do.\" - pounding sensation and racing sensation.  She does not report any other episodes of atrial fibrillation. No caffeine intake is reported, but she admits to consuming alcohol a few times weekly, though not in excess. Lightheadedness is also reported. She has been on a regimen of baby aspirin every other day since her initial episode, but this was increased to daily intake following her hospitalization on 03/07/2025. She is not currently on any beta-blockers.     A stress test was performed 5 years ago, and an echocardiogram was conducted at [facility] in 01/2025. During her hospital stay, she was administered Cardizem, which successfully converted her back to normal rhythm, and she was subsequently discharged.    Her thyroid function has been assessed and found to be within normal limits. She is currently on levothyroxine for thyroid management.    Patient c/o chest \"twinge\" and lightheadedness, SOBOE,

## 2025-06-09 ENCOUNTER — TELEPHONE (OUTPATIENT)
Dept: NEUROSURGERY | Age: 60
End: 2025-06-09

## 2025-06-09 NOTE — TELEPHONE ENCOUNTER
Pt called in to see why her appointment was changed from 6/20/2025 to July 25/2025 I took a look at her chart and there was no appointment for 6/20/2025 I tried to reschedule her for an earlier appointment but he is fully booked. She stated they had it planned out to have her surgery in September there is no mention of surgery in her office notes. I also put her on the wait list.

## 2025-06-12 ENCOUNTER — TELEPHONE (OUTPATIENT)
Dept: NEUROSURGERY | Age: 60
End: 2025-06-12

## 2025-06-12 ENCOUNTER — PROCEDURE VISIT (OUTPATIENT)
Dept: PHYSICAL MEDICINE AND REHAB | Age: 60
End: 2025-06-12

## 2025-06-12 DIAGNOSIS — M54.12 CERVICAL RADICULOPATHY AT C6: Primary | ICD-10-CM

## 2025-06-12 DIAGNOSIS — R20.2 NUMBNESS AND TINGLING OF LEFT HAND: Primary | ICD-10-CM

## 2025-06-12 DIAGNOSIS — R20.0 NUMBNESS AND TINGLING OF LEFT HAND: Primary | ICD-10-CM

## 2025-06-12 DIAGNOSIS — M54.12 CERVICAL RADICULOPATHY AT C7: ICD-10-CM

## 2025-06-12 NOTE — TELEPHONE ENCOUNTER
Pt in office today for EMG. She stated her original apt with Dr. Fernandez was on 6/20 and she did not schedule her apt on 7/25. I let her know that apt was scheduled when she was here for her previous apt with Dr. Fernandez, and that apt has not been r/s or cancelled. I also offered to print her 4/11 AVS so she can see her apt was originally scheduled for 7/25, she declined. She was not able to say when she had her external xray completed, and it was not done after her apt on 4/11, so I ordered that per last recommendation by Dr. Fernandez. Order was printed and given to patient to have completed at [facility]. She also stated that she has already completed PT and finished in January. I let her know that she has already been placed on the waitlist.

## 2025-06-12 NOTE — PROGRESS NOTES
EMG REPORT       CHIEF COMPLAINT: Pain down left arm      HISTORY OF PRESENT ILLNESS: 60 y.o. right hand dominant female with pain on the left arm for months.  She reports some headaches and face tingling on the left as well.  She reports the pain travels down to her 4th and 5th digits and causes numbness and tingling in that area.  She reports it is worse at night.  She denies any weakness.  She denies any right-sided symptoms.  She did an ulnar nerve transposition on the right, but no left-sided arm surgeries.  No cervical spine surgeries.      PHYSICAL EXAMINATION: Reflexes are trace throughout the arms.  Sensation altered in the left 4th and 5th digits.  Strength is 5/5 in elbow flexion, elbow extension, handgrip, finger and thumb abduction bilaterally.      IMPRESSION:      1.  This is a normal EMG of the left arm.  There is no evidence for cervical radiculopathy on the left.     2.  There is no evidence for carpal tunnel syndrome the left.     3.  There is no evidence for an ulnar neuropathy at the left elbow.     4.  There is no evidence for peripheral polyneuropathy affecting the left arm.    RECOMMENDATIONS: I discussed the EMG findings with her.  This is a normal EMG.  She may have some irritation of nerve roots in the cervical spine, but it is not causing any nerve dysfunction or damage at this point in time.  Follow-up with neurosurgery.     Thank you for this interesting referral.    Troy Conklin MD  Board Certified, American Board of Electrodiagnostic Medicine  Board Certified, American Board of Physical Medicine and Rehabilitation      ELECTRICAL FINDINGS:   NCV & EMG Findings:  All nerve conduction studies (as indicated in the following tables) were within normal limits.      All examined muscles (as indicated in the following table) showed no evidence of electrical instability, spontaneous activity, or abnormal motor units.        NERVE CONDUCTION STUDIES & NEEDLE EMG VALUES  Nerve Conduction

## 2025-07-30 RX ORDER — LANOLIN ALCOHOL/MO/W.PET/CERES
50 CREAM (GRAM) TOPICAL DAILY
COMMUNITY

## 2025-07-30 RX ORDER — CLOTRIMAZOLE AND BETAMETHASONE DIPROPIONATE 10; .64 MG/G; MG/G
CREAM TOPICAL 2 TIMES DAILY
COMMUNITY

## 2025-07-30 RX ORDER — ALPRAZOLAM 0.25 MG
0.25 TABLET ORAL NIGHTLY PRN
COMMUNITY

## 2025-08-15 ENCOUNTER — OFFICE VISIT (OUTPATIENT)
Dept: NEUROSURGERY | Age: 60
End: 2025-08-15
Payer: COMMERCIAL

## 2025-08-15 VITALS
DIASTOLIC BLOOD PRESSURE: 74 MMHG | SYSTOLIC BLOOD PRESSURE: 116 MMHG | BODY MASS INDEX: 25.99 KG/M2 | WEIGHT: 165.6 LBS | HEART RATE: 61 BPM | HEIGHT: 67 IN | OXYGEN SATURATION: 97 %

## 2025-08-15 DIAGNOSIS — M54.12 CERVICAL RADICULOPATHY AT C6: Primary | ICD-10-CM

## 2025-08-15 DIAGNOSIS — M40.00 KYPHOSIS (ACQUIRED) (POSTURAL): ICD-10-CM

## 2025-08-15 DIAGNOSIS — M54.12 CERVICAL RADICULOPATHY AT C7: ICD-10-CM

## 2025-08-15 PROCEDURE — 99214 OFFICE O/P EST MOD 30 MIN: CPT | Performed by: NEUROLOGICAL SURGERY

## 2025-08-18 ENCOUNTER — TELEPHONE (OUTPATIENT)
Dept: NEUROSURGERY | Age: 60
End: 2025-08-18

## 2025-08-18 ENCOUNTER — TELEPHONE (OUTPATIENT)
Dept: CARDIOLOGY CLINIC | Age: 60
End: 2025-08-18

## 2025-08-18 DIAGNOSIS — Z01.810 PRE-OPERATIVE CARDIOVASCULAR EXAMINATION: Primary | ICD-10-CM

## 2025-08-18 PROBLEM — M54.12 CERVICAL RADICULOPATHY AT C7: Status: ACTIVE | Noted: 2025-08-18

## 2025-08-18 PROBLEM — M54.12 CERVICAL RADICULOPATHY AT C6: Status: ACTIVE | Noted: 2025-08-18

## 2025-08-18 PROBLEM — M40.00 KYPHOSIS (ACQUIRED) (POSTURAL): Status: ACTIVE | Noted: 2025-08-18

## 2025-08-27 ENCOUNTER — TELEPHONE (OUTPATIENT)
Dept: NEUROSURGERY | Age: 60
End: 2025-08-27